# Patient Record
Sex: MALE | Race: WHITE | NOT HISPANIC OR LATINO | Employment: OTHER | URBAN - METROPOLITAN AREA
[De-identification: names, ages, dates, MRNs, and addresses within clinical notes are randomized per-mention and may not be internally consistent; named-entity substitution may affect disease eponyms.]

---

## 2017-01-24 ENCOUNTER — APPOINTMENT (OUTPATIENT)
Dept: CT IMAGING | Facility: HOSPITAL | Age: 67
End: 2017-01-24

## 2017-01-24 ENCOUNTER — HOSPITAL ENCOUNTER (INPATIENT)
Facility: HOSPITAL | Age: 67
LOS: 2 days | Discharge: HOME HEALTH | End: 2017-01-27
Attending: EMERGENCY MEDICINE | Admitting: FAMILY MEDICINE

## 2017-01-24 ENCOUNTER — APPOINTMENT (OUTPATIENT)
Dept: GENERAL RADIOLOGY | Facility: HOSPITAL | Age: 67
End: 2017-01-24

## 2017-01-24 DIAGNOSIS — Z74.09 IMPAIRED FUNCTIONAL MOBILITY, BALANCE, GAIT, AND ENDURANCE: ICD-10-CM

## 2017-01-24 DIAGNOSIS — J44.1 COPD EXACERBATION (HCC): Primary | ICD-10-CM

## 2017-01-24 DIAGNOSIS — R53.83 FATIGUE, UNSPECIFIED TYPE: ICD-10-CM

## 2017-01-24 DIAGNOSIS — R09.02 HYPOXIA: ICD-10-CM

## 2017-01-24 DIAGNOSIS — R06.89 HYPERCAPNIA: ICD-10-CM

## 2017-01-24 LAB
ALBUMIN SERPL-MCNC: 4.6 G/DL (ref 3.2–4.8)
ALBUMIN/GLOB SERPL: 1.3 G/DL (ref 1.5–2.5)
ALP SERPL-CCNC: 95 U/L (ref 25–100)
ALT SERPL W P-5'-P-CCNC: 13 U/L (ref 7–40)
ANION GAP SERPL CALCULATED.3IONS-SCNC: 3 MMOL/L (ref 3–11)
ARTERIAL PATENCY WRIST A: ABNORMAL
AST SERPL-CCNC: 18 U/L (ref 0–33)
ATMOSPHERIC PRESS: ABNORMAL MMHG
BASE EXCESS BLDA CALC-SCNC: 9.4 MMOL/L (ref 0–2)
BASOPHILS # BLD AUTO: 0.03 10*3/MM3 (ref 0–0.2)
BASOPHILS NFR BLD AUTO: 0.2 % (ref 0–1)
BDY SITE: ABNORMAL
BILIRUB SERPL-MCNC: 0.5 MG/DL (ref 0.3–1.2)
BUN BLD-MCNC: 14 MG/DL (ref 9–23)
BUN BLDA-MCNC: 16 MG/DL (ref 8–26)
BUN/CREAT SERPL: 17.5 (ref 7–25)
CA-I BLDA-SCNC: 1.16 MMOL/L (ref 1.2–1.32)
CALCIUM SPEC-SCNC: 10.2 MG/DL (ref 8.7–10.4)
CHLORIDE BLDA-SCNC: 99 MMOL/L (ref 98–109)
CHLORIDE SERPL-SCNC: 102 MMOL/L (ref 99–109)
CO2 BLDA-SCNC: 38.2 MMOL/L (ref 22–33)
CO2 BLDA-SCNC: 40 MMOL/L (ref 24–29)
CO2 SERPL-SCNC: 39 MMOL/L (ref 20–31)
COHGB MFR BLD: 2.6 % (ref 0–2)
CREAT BLD-MCNC: 0.8 MG/DL (ref 0.6–1.3)
CREAT BLDA-MCNC: 0.8 MG/DL (ref 0.6–1.3)
DEPRECATED RDW RBC AUTO: 52.3 FL (ref 37–54)
EOSINOPHIL # BLD AUTO: 0.29 10*3/MM3 (ref 0.1–0.3)
EOSINOPHIL NFR BLD AUTO: 1.9 % (ref 0–3)
ERYTHROCYTE [DISTWIDTH] IN BLOOD BY AUTOMATED COUNT: 15.6 % (ref 11.3–14.5)
GAS FLOW AIRWAY: 3 LPM
GFR SERPL CREATININE-BSD FRML MDRD: 117 ML/MIN/1.73
GFR SERPL CREATININE-BSD FRML MDRD: 96 ML/MIN/1.73
GLOBULIN UR ELPH-MCNC: 3.6 GM/DL
GLUCOSE BLD-MCNC: 92 MG/DL (ref 70–100)
GLUCOSE BLDC GLUCOMTR-MCNC: 96 MG/DL (ref 70–130)
HCO3 BLDA-SCNC: 36.2 MMOL/L (ref 20–26)
HCT VFR BLD AUTO: 44 % (ref 38.9–50.9)
HCT VFR BLD CALC: 38.3 %
HCT VFR BLDA CALC: 43 % (ref 38–51)
HGB BLD-MCNC: 13.6 G/DL (ref 13.1–17.5)
HGB BLDA-MCNC: 12.5 G/DL (ref 13.5–17.5)
HGB BLDA-MCNC: 14.6 G/DL (ref 12–17)
HOROWITZ INDEX BLD+IHG-RTO: 32 %
IMM GRANULOCYTES # BLD: 0.05 10*3/MM3 (ref 0–0.03)
IMM GRANULOCYTES NFR BLD: 0.3 % (ref 0–0.6)
INR PPP: 1.1 (ref 0.8–1.2)
LYMPHOCYTES # BLD AUTO: 2.09 10*3/MM3 (ref 0.6–4.8)
LYMPHOCYTES NFR BLD AUTO: 13.9 % (ref 24–44)
MCH RBC QN AUTO: 28.2 PG (ref 27–31)
MCHC RBC AUTO-ENTMCNC: 30.9 G/DL (ref 32–36)
MCV RBC AUTO: 91.3 FL (ref 80–99)
METHGB BLD QL: 0.4 % (ref 0–1.5)
MODALITY: ABNORMAL
MONOCYTES # BLD AUTO: 1.55 10*3/MM3 (ref 0–1)
MONOCYTES NFR BLD AUTO: 10.3 % (ref 0–12)
NEUTROPHILS # BLD AUTO: 11.05 10*3/MM3 (ref 1.5–8.3)
NEUTROPHILS NFR BLD AUTO: 73.4 % (ref 41–71)
OXYHGB MFR BLDV: 89.8 % (ref 94–99)
PCO2 BLDA: 65.1 MM HG (ref 35–48)
PH BLDA: 7.35 PH UNITS (ref 7.35–7.45)
PLATELET # BLD AUTO: 279 10*3/MM3 (ref 150–450)
PMV BLD AUTO: 10.1 FL (ref 6–12)
PO2 BLDA: 64.5 MM HG (ref 83–108)
POTASSIUM BLD-SCNC: 4.3 MMOL/L (ref 3.5–5.5)
POTASSIUM BLDA-SCNC: 4.3 MMOL/L (ref 3.5–4.9)
PROT SERPL-MCNC: 8.2 G/DL (ref 5.7–8.2)
PROTHROMBIN TIME: 12.8 SECONDS (ref 12.8–15.2)
RBC # BLD AUTO: 4.82 10*6/MM3 (ref 4.2–5.76)
SAO2 % BLDCOA: 89.8 %
SODIUM BLD-SCNC: 144 MMOL/L (ref 132–146)
SODIUM BLDA-SCNC: 142 MMOL/L (ref 138–146)
TROPONIN I SERPL-MCNC: 0 NG/ML (ref 0–0.07)
WBC NRBC COR # BLD: 15.06 10*3/MM3 (ref 3.5–10.8)

## 2017-01-24 PROCEDURE — 70450 CT HEAD/BRAIN W/O DYE: CPT

## 2017-01-24 PROCEDURE — 94799 UNLISTED PULMONARY SVC/PX: CPT

## 2017-01-24 PROCEDURE — 80047 BASIC METABLC PNL IONIZED CA: CPT

## 2017-01-24 PROCEDURE — 0 IOPAMIDOL PER 1 ML: Performed by: EMERGENCY MEDICINE

## 2017-01-24 PROCEDURE — 84484 ASSAY OF TROPONIN QUANT: CPT

## 2017-01-24 PROCEDURE — 82805 BLOOD GASES W/O2 SATURATION: CPT | Performed by: EMERGENCY MEDICINE

## 2017-01-24 PROCEDURE — 93005 ELECTROCARDIOGRAM TRACING: CPT | Performed by: EMERGENCY MEDICINE

## 2017-01-24 PROCEDURE — 71010 HC CHEST PA OR AP: CPT

## 2017-01-24 PROCEDURE — 94760 N-INVAS EAR/PLS OXIMETRY 1: CPT

## 2017-01-24 PROCEDURE — 85610 PROTHROMBIN TIME: CPT

## 2017-01-24 PROCEDURE — 80053 COMPREHEN METABOLIC PANEL: CPT | Performed by: EMERGENCY MEDICINE

## 2017-01-24 PROCEDURE — 94644 CONT INHLJ TX 1ST HOUR: CPT

## 2017-01-24 PROCEDURE — 0042T HC CT CEREBRAL PERFUSION W/WO CONTRAST: CPT

## 2017-01-24 PROCEDURE — 85014 HEMATOCRIT: CPT

## 2017-01-24 PROCEDURE — 63710000001 PREDNISONE PER 5 MG: Performed by: EMERGENCY MEDICINE

## 2017-01-24 PROCEDURE — 94640 AIRWAY INHALATION TREATMENT: CPT

## 2017-01-24 PROCEDURE — 99285 EMERGENCY DEPT VISIT HI MDM: CPT

## 2017-01-24 PROCEDURE — 36600 WITHDRAWAL OF ARTERIAL BLOOD: CPT | Performed by: EMERGENCY MEDICINE

## 2017-01-24 PROCEDURE — 85025 COMPLETE CBC W/AUTO DIFF WBC: CPT | Performed by: EMERGENCY MEDICINE

## 2017-01-24 RX ORDER — SODIUM CHLORIDE 0.9 % (FLUSH) 0.9 %
10 SYRINGE (ML) INJECTION AS NEEDED
Status: DISCONTINUED | OUTPATIENT
Start: 2017-01-24 | End: 2017-01-27 | Stop reason: HOSPADM

## 2017-01-24 RX ORDER — PREDNISONE 20 MG/1
60 TABLET ORAL ONCE
Status: COMPLETED | OUTPATIENT
Start: 2017-01-24 | End: 2017-01-24

## 2017-01-24 RX ORDER — ALBUTEROL SULFATE 2.5 MG/3ML
10 SOLUTION RESPIRATORY (INHALATION) CONTINUOUS
Status: DISPENSED | OUTPATIENT
Start: 2017-01-24 | End: 2017-01-25

## 2017-01-24 RX ADMIN — IOPAMIDOL 40 ML: 755 INJECTION, SOLUTION INTRAVENOUS at 22:32

## 2017-01-24 RX ADMIN — ALBUTEROL SULFATE 10 MG: 2.5 SOLUTION RESPIRATORY (INHALATION) at 23:50

## 2017-01-24 RX ADMIN — PREDNISONE 60 MG: 20 TABLET ORAL at 23:30

## 2017-01-24 NOTE — IP AVS SNAPSHOT
AFTER VISIT SUMMARY             Nam Alvarez           About your hospitalization     You were admitted on:  January 25, 2017 You last received care in the:  [facility]       Procedures & Surgeries         Medications    If you or your caregiver advised us that you are currently taking a medication and that medication is marked below as “Resume”, this simply indicates that we have reviewed those medications to make sure our new therapy recommendations do not interfere.  If you have concerns about medications other than those new ones which we are prescribing today, please consult the physician who prescribed them (or your primary physician).  Our review of your home medications is not meant to indicate that we are directing their use.             Your Medications      START taking these medications     doxycycline 100 MG capsule   Take 1 capsule by mouth Every 12 (Twelve) Hours for 7 days. Indications: Upper Respiratory Tract Infection   Last time this was given:  1/27/2017  8:26 AM   Commonly known as:  VIBRAMYCIN   Notes to Patient:  For infection, Take until complete           predniSONE 20 MG tablet   Take 1 tablet by mouth Daily With Breakfast. 40mg PO qday for 3d, 30 mg PO qday for 3d, 20 mg PO qday for  3d, 10 mg PO qday for  3d   Last time this was given:  1/27/2017  8:27 AM   Commonly known as:  DELTASONE             CONTINUE taking these medications     acetaminophen 500 MG tablet   Take 500 mg by mouth 3 (Three) Times a Day As Needed for mild pain (1-3).   Last time this was given:  1/25/2017  6:04 PM   Commonly known as:  TYLENOL           albuterol 108 (90 BASE) MCG/ACT inhaler   Inhale 2 puffs Every 4 (Four) Hours As Needed for wheezing.   Commonly known as:  PROVENTIL HFA;VENTOLIN HFA           baclofen 10 MG tablet   Take 10 mg by mouth 2 (Two) Times a Day.   Last time this was given:  1/27/2017  8:27 AM   Commonly known as:  LIORESAL           citalopram 20 MG tablet   Take  20 mg by mouth Daily.   Last time this was given:  1/27/2017  8:27 AM   Commonly known as:  CeleXA           fexofenadine 180 MG tablet   Take 180 mg by mouth Daily.   Commonly known as:  ALLEGRA           furosemide 40 MG tablet   Take 40 mg by mouth Daily. PRN swelling   Commonly known as:  LASIX           guaiFENesin 200 MG tablet   Take 600 mg by mouth Daily. At night           HYDROcodone-acetaminophen 7.5-325 MG per tablet   Take 1 tablet by mouth Every 8 (Eight) Hours As Needed for moderate pain (4-6).   Last time this was given:  1/27/2017  8:37 AM   Commonly known as:  NORCO           losartan 100 MG tablet   Take 100 mg by mouth Daily.   Commonly known as:  COZAAR           mirtazapine 15 MG tablet   Take 15 mg by mouth Every Night.   Last time this was given:  1/26/2017  8:24 PM   Commonly known as:  REMERON           mometasone-formoterol 100-5 MCG/ACT inhaler   Inhale 2 puffs 2 (Two) Times a Day.   Commonly known as:  DULERA 100           montelukast 10 MG tablet   Take 10 mg by mouth Every Night.   Last time this was given:  1/26/2017  8:24 PM   Commonly known as:  SINGULAIR           potassium chloride 10 MEQ CR capsule   Take 20 mEq by mouth As Needed (When taking Lasix).   Last time this was given:  1/27/2017  8:27 AM   Commonly known as:  MICRO-K           raNITIdine 150 MG tablet   Take 150 mg by mouth 2 (Two) Times a Day.   Commonly known as:  ZANTAC           simvastatin 20 MG tablet   Take 20 mg by mouth Every Night.   Commonly known as:  ZOCOR           tiotropium 18 MCG per inhalation capsule   Place 1 capsule into inhaler and inhale Daily.   Commonly known as:  SPIRIVA           traZODone 50 MG tablet   Take 50 mg by mouth Every Night.   Last time this was given:  1/26/2017  8:24 PM   Commonly known as:  DESYREL                Where to Get Your Medications      You can get these medications from any pharmacy     Bring a paper prescription for each of these medications     doxycycline 100 MG  capsule    predniSONE 20 MG tablet                  Your Medications      Your Medication List           Morning Noon Evening Bedtime As Needed    acetaminophen 500 MG tablet   Take 500 mg by mouth 3 (Three) Times a Day As Needed for mild pain (1-3).   Commonly known as:  TYLENOL                            3 times daily if needed       albuterol 108 (90 BASE) MCG/ACT inhaler   Inhale 2 puffs Every 4 (Four) Hours As Needed for wheezing.   Commonly known as:  PROVENTIL HFA;VENTOLIN HFA                            Every 4 hours if needed for breathing       baclofen 10 MG tablet   Take 10 mg by mouth 2 (Two) Times a Day.   Commonly known as:  LIORESAL                                      citalopram 20 MG tablet   Take 20 mg by mouth Daily.   Commonly known as:  CeleXA                                   doxycycline 100 MG capsule   Take 1 capsule by mouth Every 12 (Twelve) Hours for 7 days. Indications: Upper Respiratory Tract Infection   Commonly known as:  VIBRAMYCIN   Notes to Patient:  For infection, Take until complete                                      fexofenadine 180 MG tablet   Take 180 mg by mouth Daily.   Commonly known as:  ALLEGRA                                   furosemide 40 MG tablet   Take 40 mg by mouth Daily. PRN swelling   Commonly known as:  LASIX                            Daily if needed for swelling       guaiFENesin 200 MG tablet   Take 600 mg by mouth Daily. At night                                   HYDROcodone-acetaminophen 7.5-325 MG per tablet   Take 1 tablet by mouth Every 8 (Eight) Hours As Needed for moderate pain (4-6).   Commonly known as:  NORCO                            3 times daily if needed for pain       losartan 100 MG tablet   Take 100 mg by mouth Daily.   Commonly known as:  COZAAR                                   mirtazapine 15 MG tablet   Take 15 mg by mouth Every Night.   Commonly known as:  REMERON                                   mometasone-formoterol 100-5 MCG/ACT  inhaler   Inhale 2 puffs 2 (Two) Times a Day.   Commonly known as:  DULERA 100                                      montelukast 10 MG tablet   Take 10 mg by mouth Every Night.   Commonly known as:  SINGULAIR                                   potassium chloride 10 MEQ CR capsule   Take 20 mEq by mouth As Needed (When taking Lasix).   Commonly known as:  MICRO-K                            Take with furosemide if needed       predniSONE 20 MG tablet   Take 1 tablet by mouth Daily With Breakfast. 40mg PO qday for 3d, 30 mg PO qday for 3d, 20 mg PO qday for  3d, 10 mg PO qday for  3d   Commonly known as:  DELTASONE                                   raNITIdine 150 MG tablet   Take 150 mg by mouth 2 (Two) Times a Day.   Commonly known as:  ZANTAC                                      simvastatin 20 MG tablet   Take 20 mg by mouth Every Night.   Commonly known as:  ZOCOR                                   tiotropium 18 MCG per inhalation capsule   Place 1 capsule into inhaler and inhale Daily.   Commonly known as:  SPIRIVA                                   traZODone 50 MG tablet   Take 50 mg by mouth Every Night.   Commonly known as:  DESYREL                                            Instructions for After Discharge        Discharge References/Attachments     CHRONIC OBSTRUCTIVE PULMONARY DISEASE (ENGLISH)    SMOKING CESSATION (ENGLISH)    SMOKING CESSATION, TIPS FOR SUCCESS (ENGLISH)    STROKE PREVENTION (ENGLISH)    DOXYCYCLINE TABLETS OR CAPSULES (ENGLISH)    PREDNISONE TABLETS (ENGLISH)       Follow-ups for After Discharge        Follow-up Information     [facility] day[facility]    Specialty:  Home Health Services    Why:  Your home health has been resumed at discharge. Someone will contact you within 1 day of discharge to resume home health visits. Please call their office if you have not heard from them within a day of discharge.    Contact information:    [address]  [facility] [address]  [phone]        Follow up with No Known Provider .    Contact information:    [email or web address] [facility] [address]        Referrals and Follow-ups to Schedule     Follow-Up    As directed    In 1-2 weeks   Follow Up Details:  PCP             James Signup     [facility] [employer] allows you to send messages to your doctor, view your test results, renew your prescriptions, schedule appointments, and more. To sign up, go to [employer] and click on the Sign Up Now link in the New User? box. Enter your [employer] Activation Code exactly as it appears below along with the last four digits of your Social Security Number and your Date of Birth () to complete the sign-up process. If you do not sign up before the expiration date, you must request a new code.    [employer] Activation Code: [email or web address]  Expires: 2017  2:04 PM    If you have questions, you can email [email or web address] or call [phone] to talk to our [employer] staff. Remember, [employer] is NOT to be used for urgent needs. For medical emergencies, dial 911.           Summary of Your Hospitalization        Reason for Hospitalization     Your primary diagnosis was:  Not on File    Your diagnoses also included:  Chronic Bronchitis, Respiratory Insufficiency, Tobacco Abuse, Brain Disorder, Stroke      Care Providers     Provider Service Role Specialty    Naida Arzola MD Medicine Attending Provider Hospitalist      Your Allergies  Date Reviewed: 2017    No active allergies      Pending Labs     Order Current Status    Respiratory Culture In process    Blood Culture Preliminary result    Blood Culture Preliminary result      Patient Belongings Returned     Document Return of Belongings Flowsheet     Were the patient bedside belongings sent home?   --   Belongings Retrieved from Security & Sent Home   --    Belongings Sent to Safe   --   Medications Retrieved from Pharmacy & Sent Home    --              More Information      Chronic Obstructive Pulmonary Disease  Chronic obstructive pulmonary disease (COPD) is a common lung condition in which airflow from the lungs is limited. COPD is a general term that can be used to describe many different lung problems that limit airflow, including both chronic bronchitis and emphysema. If you have COPD, your lung function will probably never return to normal, but there are measures you can take to improve lung function and make yourself feel better.  CAUSES   · Smoking (common).  · Exposure to secondhand smoke.  · Genetic problems.  · Chronic inflammatory lung diseases or recurrent infections.  SYMPTOMS  · Shortness of breath, especially with physical activity.  · Deep, persistent (chronic) cough with a large amount of thick mucus.  · Wheezing.  · Rapid breaths (tachypnea).  · Gray or bluish discoloration (cyanosis) of the skin, especially in your fingers, toes, or lips.  · Fatigue.  · Weight loss.  · Frequent infections or episodes when breathing symptoms become much worse (exacerbations).  · Chest tightness.  DIAGNOSIS  Your health care provider will take a medical history and perform a physical examination to diagnose COPD. Additional tests for COPD may include:  · Lung (pulmonary) function tests.  · Chest X-ray.  · CT scan.  · Blood tests.  TREATMENT   Treatment for COPD may include:  · Inhaler and nebulizer medicines. These help manage the symptoms of COPD and make your breathing more comfortable.  · Supplemental oxygen. Supplemental oxygen is only helpful if you have a low oxygen level in your blood.  · Exercise and physical activity. These are beneficial for nearly all people with COPD.  · Lung surgery or transplant.  · Nutrition therapy to gain weight, if you are underweight.  · Pulmonary rehabilitation. This may involve working with a team of health care providers and specialists, such as respiratory, occupational, and physical therapists.  HOME CARE  INSTRUCTIONS  · Take all medicines (inhaled or pills) as directed by your health care provider.  · Avoid over-the-counter medicines or cough syrups that dry up your airway (such as antihistamines) and slow down the elimination of secretions unless instructed otherwise by your health care provider.  · If you are a smoker, the most important thing that you can do is stop smoking. Continuing to smoke will cause further lung damage and breathing trouble. Ask your health care provider for help with quitting smoking. He or she can direct you to community resources or hospitals that provide support.  · Avoid exposure to irritants such as smoke, chemicals, and fumes that aggravate your breathing.  · Use oxygen therapy and pulmonary rehabilitation if directed by your health care provider. If you require home oxygen therapy, ask your health care provider whether you should purchase a pulse oximeter to measure your oxygen level at home.  · Avoid contact with individuals who have a contagious illness.  · Avoid extreme temperature and humidity changes.  · Eat healthy foods. Eating smaller, more frequent meals and resting before meals may help you maintain your strength.  · Stay active, but balance activity with periods of rest. Exercise and physical activity will help you maintain your ability to do things you want to do.  · Preventing infection and hospitalization is very important when you have COPD. Make sure to receive all the vaccines your health care provider recommends, especially the pneumococcal and influenza vaccines. Ask your health care provider whether you need a pneumonia vaccine.  · Learn and use relaxation techniques to manage stress.  · Learn and use controlled breathing techniques as directed by your health care provider. Controlled breathing techniques include:    Pursed lip breathing. Start by breathing in (inhaling) through your nose for 1 second. Then, purse your lips as if you were going to whistle and  breathe out (exhale) through the pursed lips for 2 seconds.    Diaphragmatic breathing. Start by putting one hand on your abdomen just above your waist. Inhale slowly through your nose. The hand on your abdomen should move out. Then purse your lips and exhale slowly. You should be able to feel the hand on your abdomen moving in as you exhale.  · Learn and use controlled coughing to clear mucus from your lungs. Controlled coughing is a series of short, progressive coughs. The steps of controlled coughing are:  . Lean your head slightly forward.  . Breathe in deeply using diaphragmatic breathing.  . Try to hold your breath for 3 seconds.  . Keep your mouth slightly open while coughing twice.  . Spit any mucus out into a tissue.  . Rest and repeat the steps once or twice as needed.  SEEK MEDICAL CARE IF:  · You are coughing up more mucus than usual.  · There is a change in the color or thickness of your mucus.  · Your breathing is more labored than usual.  · Your breathing is faster than usual.  SEEK IMMEDIATE MEDICAL CARE IF:  · You have shortness of breath while you are resting.  · You have shortness of breath that prevents you from:    Being able to talk.    Performing your usual physical activities.  · You have chest pain lasting longer than 5 minutes.  · Your skin color is more cyanotic than usual.  · You measure low oxygen saturations for longer than 5 minutes with a pulse oximeter.  MAKE SURE YOU:  · Understand these instructions.  · Will watch your condition.  · Will get help right away if you are not doing well or get worse.     This information is not intended to replace advice given to you by your health care provider. Make sure you discuss any questions you have with your health care provider.     Document Released: 09/27/2006 Document Revised: 01/08/2016 Document Reviewed: 08/14/2014  [employer] Interactive Patient Education ©2016 [employer]          Steps to Quit Smoking   Smoking tobacco can be harmful  to your health and can affect almost every organ in your body. Smoking puts you, and those around you, at risk for developing many serious chronic diseases. Quitting smoking is difficult, but it is one of the best things that you can do for your health. It is never too late to quit.  WHAT ARE THE BENEFITS OF QUITTING SMOKING?  When you quit smoking, you lower your risk of developing serious diseases and conditions, such as:  · Lung cancer or lung disease, such as COPD.  · Heart disease.  · Stroke.  · Heart attack.  · Infertility.  · Osteoporosis and bone fractures.  Additionally, symptoms such as coughing, wheezing, and shortness of breath may get better when you quit. You may also find that you get sick less often because your body is stronger at fighting off colds and infections. If you are pregnant, quitting smoking can help to reduce your chances of having a baby of low birth weight.  HOW DO I GET READY TO QUIT?  When you decide to quit smoking, create a plan to make sure that you are successful. Before you quit:  · Pick a date to quit. Set a date within the next two weeks to give you time to prepare.  · Write down the reasons why you are quitting. Keep this list in places where you will see it often, such as on your bathroom mirror or in your car or wallet.  · Identify the people, places, things, and activities that make you want to smoke (triggers) and avoid them. Make sure to take these actions:    Throw away all cigarettes at home, at work, and in your car.    Throw away smoking accessories, such as ashtrays and lighters.    Clean your car and make sure to empty the ashtray.    Clean your home, including curtains and carpets.  · Tell your family, friends, and coworkers that you are quitting. Support from your loved ones can make quitting easier.  · Talk with your health care provider about your options for quitting smoking.  · Find out what treatment options are covered by your health insurance.  WHAT  "STRATEGIES CAN I USE TO QUIT SMOKING?   Talk with your healthcare provider about different strategies to quit smoking. Some strategies include:  · Quitting smoking altogether instead of gradually lessening how much you smoke over a period of time. Research shows that quitting \"cold turkey\" is more successful than gradually quitting.  · Attending in-person counseling to help you build problem-solving skills. You are more likely to have success in quitting if you attend several counseling sessions. Even short sessions of 10 minutes can be effective.  · Finding resources and support systems that can help you to quit smoking and remain smoke-free after you quit. These resources are most helpful when you use them often. They can include:    Online chats with a counselor.    Telephone quitlines.    Printed self-help materials.    Support groups or group counseling.    Text messaging programs.    Mobile phone applications.  · Taking medicines to help you quit smoking. (If you are pregnant or breastfeeding, talk with your health care provider first.) Some medicines contain nicotine and some do not. Both types of medicines help with cravings, but the medicines that include nicotine help to relieve withdrawal symptoms. Your health care provider may recommend:    Nicotine patches, gum, or lozenges.    Nicotine inhalers or sprays.    Non-nicotine medicine that is taken by mouth.  Talk with your health care provider about combining strategies, such as taking medicines while you are also receiving in-person counseling. Using these two strategies together makes you more likely to succeed in quitting than if you used either strategy on its own.  If you are pregnant or breastfeeding, talk with your health care provider about finding counseling or other support strategies to quit smoking. Do not take medicine to help you quit smoking unless told to do so by your health care provider.  WHAT THINGS CAN I DO TO MAKE IT EASIER TO " QUIT?  Quitting smoking might feel overwhelming at first, but there is a lot that you can do to make it easier. Take these important actions:  · Reach out to your family and friends and ask that they support and encourage you during this time. Call telephone quitlines, reach out to support groups, or work with a counselor for support.  · Ask people who smoke to avoid smoking around you.  · Avoid places that trigger you to smoke, such as bars, parties, or smoke-break areas at work.  · Spend time around people who do not smoke.  · Lessen stress in your life, because stress can be a smoking trigger for some people. To lessen stress, try:    Exercising regularly.    Deep-breathing exercises.    Yoga.    Meditating.    Performing a body scan. This involves closing your eyes, scanning your body from head to toe, and noticing which parts of your body are particularly tense. Purposefully relax the muscles in those areas.  · Download or purchase mobile phone or tablet apps (applications) that can help you stick to your quit plan by providing reminders, tips, and encouragement. There are many free apps, such as QuitGuide from the CDC (Centers for Disease Control and Prevention). You can find other support for quitting smoking (smoking cessation) through smokefree.gov and other websites.  HOW WILL I FEEL WHEN I QUIT SMOKING?  Within the first 24 hours of quitting smoking, you may start to feel some withdrawal symptoms. These symptoms are usually most noticeable 2-3 days after quitting, but they usually do not last beyond 2-3 weeks. Changes or symptoms that you might experience include:  · Mood swings.  · Restlessness, anxiety, or irritation.  · Difficulty concentrating.  · Dizziness.  · Strong cravings for sugary foods in addition to nicotine.  · Mild weight gain.  · Constipation.  · Nausea.  · Coughing or a sore throat.  · Changes in how your medicines work in your body.  · A depressed mood.  · Difficulty sleeping  (insomnia).  After the first 2-3 weeks of quitting, you may start to notice more positive results, such as:  · Improved sense of smell and taste.  · Decreased coughing and sore throat.  · Slower heart rate.  · Lower blood pressure.  · Clearer skin.  · The ability to breathe more easily.  · Fewer sick days.  Quitting smoking is very challenging for most people. Do not get discouraged if you are not successful the first time. Some people need to make many attempts to quit before they achieve long-term success. Do your best to stick to your quit plan, and talk with your health care provider if you have any questions or concerns.     This information is not intended to replace advice given to you by your health care provider. Make sure you discuss any questions you have with your health care provider.     Document Released: 12/12/2002 Document Revised: 05/03/2016 Document Reviewed: 05/03/2016  [employer] Patient Education ©2016 [employer]          Smoking Cessation, Tips for Success  If you are ready to quit smoking, congratulations! You have chosen to help yourself be healthier. Cigarettes bring nicotine, tar, carbon monoxide, and other irritants into your body. Your lungs, heart, and blood vessels will be able to work better without these poisons. There are many different ways to quit smoking. Nicotine gum, nicotine patches, a nicotine inhaler, or nicotine nasal spray can help with physical craving. Hypnosis, support groups, and medicines help break the habit of smoking.  WHAT THINGS CAN I DO TO MAKE QUITTING EASIER?   Here are some tips to help you quit for good:  · Pick a date when you will quit smoking completely. Tell all of your friends and family about your plan to quit on that date.  · Do not try to slowly cut down on the number of cigarettes you are smoking. Pick a quit date and quit smoking completely starting on that day.  · Throw away all cigarettes.    · Clean and remove all ashtrays from your  "home, work, and car.  · On a card, write down your reasons for quitting. Carry the card with you and read it when you get the urge to smoke.  · Cleanse your body of nicotine. Drink enough water and fluids to keep your urine clear or pale yellow. Do this after quitting to flush the nicotine from your body.  · Learn to predict your moods. Do not let a bad situation be your excuse to have a cigarette. Some situations in your life might tempt you into wanting a cigarette.  · Never have \"just one\" cigarette. It leads to wanting another and another. Remind yourself of your decision to quit.  · Change habits associated with smoking. If you smoked while driving or when feeling stressed, try other activities to replace smoking. Stand up when drinking your coffee. Brush your teeth after eating. Sit in a different chair when you read the paper. Avoid alcohol while trying to quit, and try to drink fewer caffeinated beverages. Alcohol and caffeine may urge you to smoke.  · Avoid foods and drinks that can trigger a desire to smoke, such as sugary or spicy foods and alcohol.  · Ask people who smoke not to smoke around you.  · Have something planned to do right after eating or having a cup of coffee. For example, plan to take a walk or exercise.  · Try a relaxation exercise to calm you down and decrease your stress. Remember, you may be tense and nervous for the first 2 weeks after you quit, but this will pass.  · Find new activities to keep your hands busy. Play with a pen, coin, or rubber band. Doodle or draw things on paper.  · Brush your teeth right after eating. This will help cut down on the craving for the taste of tobacco after meals. You can also try mouthwash.    · Use oral substitutes in place of cigarettes. Try using lemon drops, carrots, cinnamon sticks, or chewing gum. Keep them handy so they are available when you have the urge to smoke.  · When you have the urge to smoke, try deep breathing.  · Designate your home " "as a nonsmoking area.  · If you are a heavy smoker, ask your health care provider about a prescription for nicotine chewing gum. It can ease your withdrawal from nicotine.  · Reward yourself. Set aside the cigarette money you save and buy yourself something nice.  · Look for support from others. Join a support group or smoking cessation program. Ask someone at home or at work to help you with your plan to quit smoking.  · Always ask yourself, \"Do I need this cigarette or is this just a reflex?\" Tell yourself, \"Today, I choose not to smoke,\" or \"I do not want to smoke.\" You are reminding yourself of your decision to quit.  · Do not replace cigarette smoking with electronic cigarettes (commonly called e-cigarettes). The safety of e-cigarettes is unknown, and some may contain harmful chemicals.  · If you relapse, do not give up! Plan ahead and think about what you will do the next time you get the urge to smoke.  HOW WILL I FEEL WHEN I QUIT SMOKING?  You may have symptoms of withdrawal because your body is used to nicotine (the addictive substance in cigarettes). You may crave cigarettes, be irritable, feel very hungry, cough often, get headaches, or have difficulty concentrating. The withdrawal symptoms are only temporary. They are strongest when you first quit but will go away within 10-14 days. When withdrawal symptoms occur, stay in control. Think about your reasons for quitting. Remind yourself that these are signs that your body is healing and getting used to being without cigarettes. Remember that withdrawal symptoms are easier to treat than the major diseases that smoking can cause.   Even after the withdrawal is over, expect periodic urges to smoke. However, these cravings are generally short lived and will go away whether you smoke or not. Do not smoke!  WHAT RESOURCES ARE AVAILABLE TO HELP ME QUIT SMOKING?  Your health care provider can direct you to community resources or hospitals for support, which may " include:  · Group support.  · Education.  · Hypnosis.  · Therapy.     This information is not intended to replace advice given to you by your health care provider. Make sure you discuss any questions you have with your health care provider.     Document Released: 09/15/2005 Document Revised: 01/08/2016 Document Reviewed: 06/05/2014  [employer] Interactive Patient Education ©2016 [employer]          Stroke Prevention  Some medical conditions and behaviors are associated with an increased chance of having a stroke. You may prevent a stroke by making healthy choices and managing medical conditions.  HOW CAN I REDUCE MY RISK OF HAVING A STROKE?   · Stay physically active. Get at least 30 minutes of activity on most or all days.  · Do not smoke. It may also be helpful to avoid exposure to secondhand smoke.  · Limit alcohol use. Moderate alcohol use is considered to be:  ¨ No more than 2 drinks per day for men.  ¨ No more than 1 drink per day for nonpregnant women.  · Eat healthy foods. This involves:  ¨ Eating 5 or more servings of fruits and vegetables a day.  ¨ Making dietary changes that address high blood pressure (hypertension), high cholesterol, diabetes, or obesity.  · Manage your cholesterol levels.  ¨ Making food choices that are high in fiber and low in saturated fat, trans fat, and cholesterol may control cholesterol levels.  ¨ Take any prescribed medicines to control cholesterol as directed by your health care provider.  · Manage your diabetes.  ¨ Controlling your carbohydrate and sugar intake is recommended to manage diabetes.  ¨ Take any prescribed medicines to control diabetes as directed by your health care provider.  · Control your hypertension.  ¨ Making food choices that are low in salt (sodium), saturated fat, trans fat, and cholesterol is recommended to manage hypertension.  ¨ Ask your health care provider if you need treatment to lower your blood pressure. Take any prescribed medicines to control  hypertension as directed by your health care provider.  ¨ If you are 18-39 years of age, have your blood pressure checked every 3-5 years. If you are 40 years of age or older, have your blood pressure checked every year.  · Maintain a healthy weight.  ¨ Reducing calorie intake and making food choices that are low in sodium, saturated fat, trans fat, and cholesterol are recommended to manage weight.  · Stop drug abuse.  · Avoid taking birth control pills.  ¨ Talk to your health care provider about the risks of taking birth control pills if you are over 35 years old, smoke, get migraines, or have ever had a blood clot.  · Get evaluated for sleep disorders (sleep apnea).  ¨ Talk to your health care provider about getting a sleep evaluation if you snore a lot or have excessive sleepiness.  · Take medicines only as directed by your health care provider.  ¨ For some people, aspirin or blood thinners (anticoagulants) are helpful in reducing the risk of forming abnormal blood clots that can lead to stroke. If you have the irregular heart rhythm of atrial fibrillation, you should be on a blood thinner unless there is a good reason you cannot take them.  ¨ Understand all your medicine instructions.  · Make sure that other conditions (such as anemia or atherosclerosis) are addressed.  SEEK IMMEDIATE MEDICAL CARE IF:   · You have sudden weakness or numbness of the face, arm, or leg, especially on one side of the body.  · Your face or eyelid droops to one side.  · You have sudden confusion.  · You have trouble speaking (aphasia) or understanding.  · You have sudden trouble seeing in one or both eyes.  · You have sudden trouble walking.  · You have dizziness.  · You have a loss of balance or coordination.  · You have a sudden, severe headache with no known cause.  · You have new chest pain or an irregular heartbeat.  Any of these symptoms may represent a serious problem that is an emergency. Do not wait to see if the symptoms  will go away. Get medical help at once. Call your local emergency services (911 in [place] Do not drive yourself to the hospital.     This information is not intended to replace advice given to you by your health care provider. Make sure you discuss any questions you have with your health care provider.     Document Released: 01/25/2006 Document Revised: 01/08/2016 Document Reviewed: 06/20/2014  [employer] Interactive Patient Education ©2016 [employer]          Doxycycline tablets or capsules  What is this medicine?  DOXYCYCLINE (dox claudia nicole) is a tetracycline antibiotic. It kills certain bacteria or stops their growth. It is used to treat many kinds of infections, like dental, skin, respiratory, and urinary tract infections. It also treats acne, Lyme disease, malaria, and certain sexually transmitted infections.  This medicine may be used for other purposes; ask your health care provider or pharmacist if you have questions.  What should I tell my health care provider before I take this medicine?  They need to know if you have any of these conditions:  -liver disease  -long exposure to sunlight like working outdoors  -stomach problems like colitis  -an unusual or allergic reaction to doxycycline, tetracycline antibiotics, other medicines, foods, dyes, or preservatives  -pregnant or trying to get pregnant  -breast-feeding  How should I use this medicine?  Take this medicine by mouth with a full glass of water. Follow the directions on the prescription label. It is best to take this medicine without food, but if it upsets your stomach take it with food. Take your medicine at regular intervals. Do not take your medicine more often than directed. Take all of your medicine as directed even if you think you are better. Do not skip doses or stop your medicine early.  Talk to your pediatrician regarding the use of this medicine in children. While this drug may be prescribed for selected conditions, precautions do  apply.  Overdosage: If you think you have taken too much of this medicine contact a poison control center or emergency room at once.  NOTE: This medicine is only for you. Do not share this medicine with others.  What if I miss a dose?  If you miss a dose, take it as soon as you can. If it is almost time for your next dose, take only that dose. Do not take double or extra doses.  What may interact with this medicine?  -antacids  -barbiturates  -birth control pills  -bismuth subsalicylate  -carbamazepine  -methoxyflurane  -other antibiotics  -phenytoin  -vitamins that contain iron  -warfarin  This list may not describe all possible interactions. Give your health care provider a list of all the medicines, herbs, non-prescription drugs, or dietary supplements you use. Also tell them if you smoke, drink alcohol, or use illegal drugs. Some items may interact with your medicine.  What should I watch for while using this medicine?  Tell your doctor or health care professional if your symptoms do not improve.  Do not treat diarrhea with over the counter products. Contact your doctor if you have diarrhea that lasts more than 2 days or if it is severe and watery.  Do not take this medicine just before going to bed. It may not dissolve properly when you lay down and can cause pain in your throat. Drink plenty of fluids while taking this medicine to also help reduce irritation in your throat.  This medicine can make you more sensitive to the sun. Keep out of the sun. If you cannot avoid being in the sun, wear protective clothing and use sunscreen. Do not use sun lamps or tanning beds/booths.  Birth control pills may not work properly while you are taking this medicine. Talk to your doctor about using an extra method of birth control.  If you are being treated for a sexually transmitted infection, avoid sexual contact until you have finished your treatment. Your sexual partner may also need treatment.  Avoid antacids, aluminum,  calcium, magnesium, and iron products for 4 hours before and 2 hours after taking a dose of this medicine.  If you are using this medicine to prevent malaria, you should still protect yourself from contact with mosquitos. Stay in screened-in areas, use mosquito nets, keep your body covered, and use an insect repellent.  What side effects may I notice from receiving this medicine?  Side effects that you should report to your doctor or health care professional as soon as possible:  -allergic reactions like skin rash, itching or hives, swelling of the face, lips, or tongue  -difficulty breathing  -fever  -itching in the rectal or genital area  -pain on swallowing  -redness, blistering, peeling or loosening of the skin, including inside the mouth  -severe stomach pain or cramps  -unusual bleeding or bruising  -unusually weak or tired  -yellowing of the eyes or skin  Side effects that usually do not require medical attention (report to your doctor or health care professional if they continue or are bothersome):  -diarrhea  -loss of appetite  -nausea, vomiting  This list may not describe all possible side effects. Call your doctor for medical advice about side effects. You may report side effects to FDA at 3-735-FDA-3898.  Where should I keep my medicine?  Keep out of the reach of children.  Store at room temperature, below 30 degrees C (86 degrees F). Protect from light. Keep container tightly closed. Throw away any unused medicine after the expiration date. Taking this medicine after the expiration date can make you seriously ill.  NOTE: This sheet is a summary. It may not cover all possible information. If you have questions about this medicine, talk to your doctor, pharmacist, or health care provider.     © 2016, [employer]. (2016-04-08 12:10:28)          Prednisone tablets  What is this medicine?  PREDNISONE (PRED ni sone) is a corticosteroid. It is commonly used to treat inflammation of the skin, joints,  lungs, and other organs. Common conditions treated include asthma, allergies, and arthritis. It is also used for other conditions, such as blood disorders and diseases of the adrenal glands.  This medicine may be used for other purposes; ask your health care provider or pharmacist if you have questions.  What should I tell my health care provider before I take this medicine?  They need to know if you have any of these conditions:  -Cushing's syndrome  -diabetes  -glaucoma  -heart disease  -high blood pressure  -infection (especially a virus infection such as chickenpox, cold sores, or herpes)  -kidney disease  -liver disease  -mental illness  -myasthenia gravis  -osteoporosis  -seizures  -stomach or intestine problems  -thyroid disease  -an unusual or allergic reaction to lactose, prednisone, other medicines, foods, dyes, or preservatives  -pregnant or trying to get pregnant  -breast-feeding  How should I use this medicine?  Take this medicine by mouth with a glass of water. Follow the directions on the prescription label. Take this medicine with food. If you are taking this medicine once a day, take it in the morning. Do not take more medicine than you are told to take. Do not suddenly stop taking your medicine because you may develop a severe reaction. Your doctor will tell you how much medicine to take. If your doctor wants you to stop the medicine, the dose may be slowly lowered over time to avoid any side effects.  Talk to your pediatrician regarding the use of this medicine in children. Special care may be needed.  Overdosage: If you think you have taken too much of this medicine contact a poison control center or emergency room at once.  NOTE: This medicine is only for you. Do not share this medicine with others.  What if I miss a dose?  If you miss a dose, take it as soon as you can. If it is almost time for your next dose, talk to your doctor or health care professional. You may need to miss a dose or take  an extra dose. Do not take double or extra doses without advice.  What may interact with this medicine?  Do not take this medicine with any of the following medications:  -metyrapone  -mifepristone  This medicine may also interact with the following medications:  -aminoglutethimide  -amphotericin B  -aspirin and aspirin-like medicines  -barbiturates  -certain medicines for diabetes, like glipizide or glyburide  -cholestyramine  -cholinesterase inhibitors  -cyclosporine  -digoxin  -diuretics  -ephedrine  -female hormones, like estrogens and birth control pills  -isoniazid  -ketoconazole  -NSAIDS, medicines for pain and inflammation, like ibuprofen or naproxen  -phenytoin  -rifampin  -toxoids  -vaccines  -warfarin  This list may not describe all possible interactions. Give your health care provider a list of all the medicines, herbs, non-prescription drugs, or dietary supplements you use. Also tell them if you smoke, drink alcohol, or use illegal drugs. Some items may interact with your medicine.  What should I watch for while using this medicine?  Visit your doctor or health care professional for regular checks on your progress. If you are taking this medicine over a prolonged period, carry an identification card with your name and address, the type and dose of your medicine, and your doctor's name and address.  This medicine may increase your risk of getting an infection. Tell your doctor or health care professional if you are around anyone with measles or chickenpox, or if you develop sores or blisters that do not heal properly.  If you are going to have surgery, tell your doctor or health care professional that you have taken this medicine within the last twelve months.  Ask your doctor or health care professional about your diet. You may need to lower the amount of salt you eat.  This medicine may affect blood sugar levels. If you have diabetes, check with your doctor or health care professional before you change  your diet or the dose of your diabetic medicine.  What side effects may I notice from receiving this medicine?  Side effects that you should report to your doctor or health care professional as soon as possible:  -allergic reactions like skin rash, itching or hives, swelling of the face, lips, or tongue  -changes in emotions or moods  -changes in vision  -depressed mood  -eye pain  -fever or chills, cough, sore throat, pain or difficulty passing urine  -increased thirst  -swelling of ankles, feet  Side effects that usually do not require medical attention (report to your doctor or health care professional if they continue or are bothersome):  -confusion, excitement, restlessness  -headache  -nausea, vomiting  -skin problems, acne, thin and shiny skin  -trouble sleeping  -weight gain  This list may not describe all possible side effects. Call your doctor for medical advice about side effects. You may report side effects to FDA at 5-725-FDA-6863.  Where should I keep my medicine?  Keep out of the reach of children.  Store at room temperature between 15 and 30 degrees C (59 and 86 degrees F). Protect from light. Keep container tightly closed. Throw away any unused medicine after the expiration date.  NOTE: This sheet is a summary. It may not cover all possible information. If you have questions about this medicine, talk to your doctor, pharmacist, or health care provider.     © 2016, [employer]. (2012-08-02 10:57:14)            SYMPTOMS OF A STROKE    Call 911 or have someone take you to the Emergency Department if you have any of the following:    · Sudden numbness or weakness of your face, arm or leg especially on one side of the body  · Sudden confusion, diffiiculty speaking or trouble understanding   · Changes in your vision or loss of sight in one eye  · Sudden severe headache with no known cause  · sudden dizziness, trouble walking, loss of balance or coordination    It is important to seek emergency  care right away if you have further stroke symptoms. If you get emergency help quickly, the powerful clot-dissolving medicines can reduce the disabilities caused by a stroke.     For more information:    American Stroke Association  5-908-7-STROKE  www.strokeassociation.org           IF YOU SMOKE OR USE TOBACCO PLEASE READ THE FOLLOWING:    Why is smoking bad for me?  Smoking increases the risk of heart disease, lung disease, vascular disease, stroke, and cancer.     If you smoke, STOP!    If you would like more information on quitting smoking, please visit the [employer] website: [email or web address]   This link will provide additional resources including the QUIT line and the [employer] support groups.     For more information:    American Cancer Society  [phone]    American Heart Association  1-978.721.7892               YOU ARE THE MOST IMPORTANT FACTOR IN YOUR RECOVERY.     Follow all instructions carefully.     I have reviewed my discharge instructions with my nurse, including the following information, if applicable:     Information about my illness and diagnosis   Follow up appointments (including lab draws)   Wound Care   Equipment Needs   Medications (new and continuing) along with side effects   Preventative information such as vaccines and smoking cessations   Diet   Pain   I know when to contact my Doctor's office or seek emergency care      I want my nurse to describe the side effects of my medications: YES NO   If the answer is no, I understand the side effects of my medications: YES NO   My nurse described the side effects of my medications in a way that I could understand: YES NO   I have taken my personal belongings and my own medications with me at discharge: YES NO            I have received this information and my questions have been answered. I have discussed any concerns I see with this plan with the nurse or physician. I understand  these instructions.    Signature of Patient or Responsible Person: _____________________________________    Date: _________________  Time: __________________    Signature of Healthcare Provider: _______________________________________  Date: _________________  Time: __________________

## 2017-01-25 PROBLEM — Z72.0 TOBACCO ABUSE: Status: ACTIVE | Noted: 2017-01-25

## 2017-01-25 PROBLEM — J96.02 ACUTE HYPERCAPNIC RESPIRATORY FAILURE (HCC): Status: ACTIVE | Noted: 2017-01-25

## 2017-01-25 PROBLEM — J44.1 COPD EXACERBATION (HCC): Status: ACTIVE | Noted: 2017-01-25

## 2017-01-25 PROBLEM — G93.40 ENCEPHALOPATHY: Status: ACTIVE | Noted: 2017-01-25

## 2017-01-25 LAB
ARTERIAL PATENCY WRIST A: ABNORMAL
ATMOSPHERIC PRESS: ABNORMAL MMHG
BASE EXCESS BLDA CALC-SCNC: 9.8 MMOL/L (ref 0–2)
BASOPHILS # BLD AUTO: 0.03 10*3/MM3 (ref 0–0.2)
BASOPHILS NFR BLD AUTO: 0.2 % (ref 0–1)
BDY SITE: ABNORMAL
CA-I SERPL ISE-MCNC: 1.18 MMOL/L (ref 1.12–1.32)
CO2 BLDA-SCNC: 38.1 MMOL/L (ref 22–33)
COHGB MFR BLD: 2.4 % (ref 0–2)
DEPRECATED RDW RBC AUTO: 52.8 FL (ref 37–54)
EOSINOPHIL # BLD AUTO: 0.03 10*3/MM3 (ref 0.1–0.3)
EOSINOPHIL NFR BLD AUTO: 0.2 % (ref 0–3)
ERYTHROCYTE [DISTWIDTH] IN BLOOD BY AUTOMATED COUNT: 15.6 % (ref 11.3–14.5)
HCO3 BLDA-SCNC: 36.2 MMOL/L (ref 20–26)
HCT VFR BLD AUTO: 38.6 % (ref 38.9–50.9)
HCT VFR BLD CALC: 36.3 %
HGB BLD-MCNC: 11.7 G/DL (ref 13.1–17.5)
HGB BLDA-MCNC: 11.9 G/DL (ref 13.5–17.5)
HOLD SPECIMEN: NORMAL
HOROWITZ INDEX BLD+IHG-RTO: 35 %
IMM GRANULOCYTES # BLD: 0.05 10*3/MM3 (ref 0–0.03)
IMM GRANULOCYTES NFR BLD: 0.3 % (ref 0–0.6)
LYMPHOCYTES # BLD AUTO: 0.92 10*3/MM3 (ref 0.6–4.8)
LYMPHOCYTES NFR BLD AUTO: 6 % (ref 24–44)
MCH RBC QN AUTO: 27.7 PG (ref 27–31)
MCHC RBC AUTO-ENTMCNC: 30.3 G/DL (ref 32–36)
MCV RBC AUTO: 91.3 FL (ref 80–99)
METHGB BLD QL: 0.9 % (ref 0–1.5)
MODALITY: ABNORMAL
MONOCYTES # BLD AUTO: 1.39 10*3/MM3 (ref 0–1)
MONOCYTES NFR BLD AUTO: 9.1 % (ref 0–12)
NEUTROPHILS # BLD AUTO: 12.91 10*3/MM3 (ref 1.5–8.3)
NEUTROPHILS NFR BLD AUTO: 84.2 % (ref 41–71)
OXYHGB MFR BLDV: 90.8 % (ref 94–99)
PCO2 BLDA: 61.6 MM HG (ref 35–48)
PH BLDA: 7.38 PH UNITS (ref 7.35–7.45)
PLATELET # BLD AUTO: 247 10*3/MM3 (ref 150–450)
PMV BLD AUTO: 10.5 FL (ref 6–12)
PO2 BLDA: 65.1 MM HG (ref 83–108)
RBC # BLD AUTO: 4.23 10*6/MM3 (ref 4.2–5.76)
WBC NRBC COR # BLD: 15.33 10*3/MM3 (ref 3.5–10.8)
WHOLE BLOOD HOLD SPECIMEN: NORMAL

## 2017-01-25 PROCEDURE — 85025 COMPLETE CBC W/AUTO DIFF WBC: CPT | Performed by: NURSE PRACTITIONER

## 2017-01-25 PROCEDURE — 25010000002 LEVOFLOXACIN PER 250 MG: Performed by: NURSE PRACTITIONER

## 2017-01-25 PROCEDURE — 94799 UNLISTED PULMONARY SVC/PX: CPT

## 2017-01-25 PROCEDURE — 25010000002 METHYLPREDNISOLONE PER 125 MG: Performed by: NURSE PRACTITIONER

## 2017-01-25 PROCEDURE — 82805 BLOOD GASES W/O2 SATURATION: CPT | Performed by: NURSE PRACTITIONER

## 2017-01-25 PROCEDURE — 94660 CPAP INITIATION&MGMT: CPT

## 2017-01-25 PROCEDURE — 94640 AIRWAY INHALATION TREATMENT: CPT

## 2017-01-25 PROCEDURE — 94760 N-INVAS EAR/PLS OXIMETRY 1: CPT

## 2017-01-25 PROCEDURE — 82330 ASSAY OF CALCIUM: CPT | Performed by: NURSE PRACTITIONER

## 2017-01-25 PROCEDURE — 25010000002 ENOXAPARIN PER 10 MG: Performed by: NURSE PRACTITIONER

## 2017-01-25 PROCEDURE — 87040 BLOOD CULTURE FOR BACTERIA: CPT | Performed by: NURSE PRACTITIONER

## 2017-01-25 PROCEDURE — 99223 1ST HOSP IP/OBS HIGH 75: CPT | Performed by: INTERNAL MEDICINE

## 2017-01-25 PROCEDURE — 5A09357 ASSISTANCE WITH RESPIRATORY VENTILATION, LESS THAN 24 CONSECUTIVE HOURS, CONTINUOUS POSITIVE AIRWAY PRESSURE: ICD-10-PCS | Performed by: EMERGENCY MEDICINE

## 2017-01-25 PROCEDURE — 63710000001 PREDNISONE PER 5 MG: Performed by: HOSPITALIST

## 2017-01-25 PROCEDURE — 36600 WITHDRAWAL OF ARTERIAL BLOOD: CPT | Performed by: NURSE PRACTITIONER

## 2017-01-25 RX ORDER — LOSARTAN POTASSIUM 100 MG/1
100 TABLET ORAL DAILY
COMMUNITY

## 2017-01-25 RX ORDER — POTASSIUM CHLORIDE 750 MG/1
20 CAPSULE, EXTENDED RELEASE ORAL DAILY
Status: DISCONTINUED | OUTPATIENT
Start: 2017-01-25 | End: 2017-01-27 | Stop reason: HOSPADM

## 2017-01-25 RX ORDER — ACETAMINOPHEN 325 MG/1
650 TABLET ORAL EVERY 4 HOURS PRN
Status: DISCONTINUED | OUTPATIENT
Start: 2017-01-25 | End: 2017-01-27 | Stop reason: HOSPADM

## 2017-01-25 RX ORDER — LOSARTAN POTASSIUM 50 MG/1
100 TABLET ORAL DAILY
Status: DISCONTINUED | OUTPATIENT
Start: 2017-01-25 | End: 2017-01-25

## 2017-01-25 RX ORDER — METHYLPREDNISOLONE SODIUM SUCCINATE 125 MG/2ML
60 INJECTION, POWDER, LYOPHILIZED, FOR SOLUTION INTRAMUSCULAR; INTRAVENOUS EVERY 8 HOURS SCHEDULED
Status: DISCONTINUED | OUTPATIENT
Start: 2017-01-25 | End: 2017-01-25

## 2017-01-25 RX ORDER — LOSARTAN POTASSIUM 50 MG/1
100 TABLET ORAL DAILY
Status: ON HOLD | COMMUNITY
End: 2017-01-25

## 2017-01-25 RX ORDER — LEVOFLOXACIN 5 MG/ML
750 INJECTION, SOLUTION INTRAVENOUS DAILY
Status: DISCONTINUED | OUTPATIENT
Start: 2017-01-25 | End: 2017-01-25

## 2017-01-25 RX ORDER — RANITIDINE 150 MG/1
150 TABLET ORAL 2 TIMES DAILY
COMMUNITY

## 2017-01-25 RX ORDER — MONTELUKAST SODIUM 10 MG/1
10 TABLET ORAL NIGHTLY
Status: DISCONTINUED | OUTPATIENT
Start: 2017-01-25 | End: 2017-01-27 | Stop reason: HOSPADM

## 2017-01-25 RX ORDER — SIMVASTATIN 20 MG
20 TABLET ORAL NIGHTLY
COMMUNITY

## 2017-01-25 RX ORDER — ONDANSETRON 4 MG/1
4 TABLET, FILM COATED ORAL EVERY 6 HOURS PRN
Status: DISCONTINUED | OUTPATIENT
Start: 2017-01-25 | End: 2017-01-27 | Stop reason: HOSPADM

## 2017-01-25 RX ORDER — HYDROCODONE BITARTRATE AND ACETAMINOPHEN 7.5; 325 MG/1; MG/1
1 TABLET ORAL EVERY 8 HOURS PRN
Status: DISCONTINUED | OUTPATIENT
Start: 2017-01-25 | End: 2017-01-27 | Stop reason: HOSPADM

## 2017-01-25 RX ORDER — BISACODYL 10 MG
10 SUPPOSITORY, RECTAL RECTAL DAILY PRN
Status: DISCONTINUED | OUTPATIENT
Start: 2017-01-25 | End: 2017-01-27 | Stop reason: HOSPADM

## 2017-01-25 RX ORDER — TRAZODONE HYDROCHLORIDE 50 MG/1
50 TABLET ORAL NIGHTLY
Status: DISCONTINUED | OUTPATIENT
Start: 2017-01-25 | End: 2017-01-27 | Stop reason: HOSPADM

## 2017-01-25 RX ORDER — TRAZODONE HYDROCHLORIDE 50 MG/1
50 TABLET ORAL NIGHTLY
COMMUNITY

## 2017-01-25 RX ORDER — ONDANSETRON 2 MG/ML
4 INJECTION INTRAMUSCULAR; INTRAVENOUS EVERY 6 HOURS PRN
Status: DISCONTINUED | OUTPATIENT
Start: 2017-01-25 | End: 2017-01-27 | Stop reason: HOSPADM

## 2017-01-25 RX ORDER — FUROSEMIDE 40 MG/1
40 TABLET ORAL DAILY
COMMUNITY

## 2017-01-25 RX ORDER — SENNA AND DOCUSATE SODIUM 50; 8.6 MG/1; MG/1
2 TABLET, FILM COATED ORAL 2 TIMES DAILY
Status: DISCONTINUED | OUTPATIENT
Start: 2017-01-25 | End: 2017-01-27 | Stop reason: HOSPADM

## 2017-01-25 RX ORDER — GUAIFENESIN 200 MG/1
600 TABLET ORAL DAILY
COMMUNITY

## 2017-01-25 RX ORDER — CITALOPRAM 20 MG/1
20 TABLET ORAL DAILY
COMMUNITY

## 2017-01-25 RX ORDER — BACLOFEN 10 MG/1
10 TABLET ORAL 2 TIMES DAILY
Status: DISCONTINUED | OUTPATIENT
Start: 2017-01-25 | End: 2017-01-27 | Stop reason: HOSPADM

## 2017-01-25 RX ORDER — PREDNISONE 20 MG/1
40 TABLET ORAL
Status: DISCONTINUED | OUTPATIENT
Start: 2017-01-25 | End: 2017-01-27 | Stop reason: HOSPADM

## 2017-01-25 RX ORDER — BACLOFEN 10 MG/1
10 TABLET ORAL 2 TIMES DAILY
COMMUNITY

## 2017-01-25 RX ORDER — CITALOPRAM 20 MG/1
20 TABLET ORAL DAILY
Status: DISCONTINUED | OUTPATIENT
Start: 2017-01-25 | End: 2017-01-27 | Stop reason: HOSPADM

## 2017-01-25 RX ORDER — MIRTAZAPINE 15 MG/1
15 TABLET, FILM COATED ORAL NIGHTLY
COMMUNITY

## 2017-01-25 RX ORDER — MONTELUKAST SODIUM 10 MG/1
10 TABLET ORAL NIGHTLY
COMMUNITY

## 2017-01-25 RX ORDER — HYDROCODONE BITARTRATE AND ACETAMINOPHEN 7.5; 325 MG/1; MG/1
1 TABLET ORAL EVERY 8 HOURS PRN
COMMUNITY

## 2017-01-25 RX ORDER — NICOTINE 21 MG/24HR
1 PATCH, TRANSDERMAL 24 HOURS TRANSDERMAL EVERY 24 HOURS
Status: DISCONTINUED | OUTPATIENT
Start: 2017-01-25 | End: 2017-01-27 | Stop reason: HOSPADM

## 2017-01-25 RX ORDER — IPRATROPIUM BROMIDE AND ALBUTEROL SULFATE 2.5; .5 MG/3ML; MG/3ML
3 SOLUTION RESPIRATORY (INHALATION)
Status: DISCONTINUED | OUTPATIENT
Start: 2017-01-25 | End: 2017-01-27 | Stop reason: HOSPADM

## 2017-01-25 RX ORDER — IPRATROPIUM BROMIDE AND ALBUTEROL SULFATE 2.5; .5 MG/3ML; MG/3ML
3 SOLUTION RESPIRATORY (INHALATION) EVERY 4 HOURS PRN
Status: DISCONTINUED | OUTPATIENT
Start: 2017-01-25 | End: 2017-01-27 | Stop reason: HOSPADM

## 2017-01-25 RX ORDER — POTASSIUM CHLORIDE 750 MG/1
20 CAPSULE, EXTENDED RELEASE ORAL AS NEEDED
COMMUNITY

## 2017-01-25 RX ORDER — ACETAMINOPHEN 500 MG
500 TABLET ORAL 3 TIMES DAILY PRN
COMMUNITY

## 2017-01-25 RX ORDER — MIRTAZAPINE 15 MG/1
15 TABLET, FILM COATED ORAL NIGHTLY
Status: DISCONTINUED | OUTPATIENT
Start: 2017-01-25 | End: 2017-01-27 | Stop reason: HOSPADM

## 2017-01-25 RX ORDER — SODIUM CHLORIDE 0.9 % (FLUSH) 0.9 %
1-10 SYRINGE (ML) INJECTION AS NEEDED
Status: DISCONTINUED | OUTPATIENT
Start: 2017-01-25 | End: 2017-01-27 | Stop reason: HOSPADM

## 2017-01-25 RX ORDER — FEXOFENADINE HCL 180 MG/1
180 TABLET ORAL DAILY
COMMUNITY

## 2017-01-25 RX ORDER — DOXYCYCLINE HYCLATE 100 MG/1
100 CAPSULE ORAL EVERY 12 HOURS SCHEDULED
Status: DISCONTINUED | OUTPATIENT
Start: 2017-01-25 | End: 2017-01-27 | Stop reason: HOSPADM

## 2017-01-25 RX ORDER — ALBUTEROL SULFATE 90 UG/1
2 AEROSOL, METERED RESPIRATORY (INHALATION) EVERY 4 HOURS PRN
COMMUNITY

## 2017-01-25 RX ADMIN — POTASSIUM CHLORIDE 20 MEQ: 750 CAPSULE, EXTENDED RELEASE ORAL at 12:25

## 2017-01-25 RX ADMIN — DOCUSATE SODIUM AND SENNOSIDES 2 TABLET: 8.6; 5 TABLET, FILM COATED ORAL at 18:04

## 2017-01-25 RX ADMIN — BACLOFEN 10 MG: 10 TABLET ORAL at 12:26

## 2017-01-25 RX ADMIN — METHYLPREDNISOLONE SODIUM SUCCINATE 60 MG: 125 INJECTION, POWDER, FOR SOLUTION INTRAMUSCULAR; INTRAVENOUS at 09:39

## 2017-01-25 RX ADMIN — IPRATROPIUM BROMIDE AND ALBUTEROL SULFATE 3 ML: .5; 3 SOLUTION RESPIRATORY (INHALATION) at 23:49

## 2017-01-25 RX ADMIN — IPRATROPIUM BROMIDE AND ALBUTEROL SULFATE 3 ML: .5; 3 SOLUTION RESPIRATORY (INHALATION) at 16:05

## 2017-01-25 RX ADMIN — IPRATROPIUM BROMIDE AND ALBUTEROL SULFATE 3 ML: .5; 3 SOLUTION RESPIRATORY (INHALATION) at 18:43

## 2017-01-25 RX ADMIN — DOXYCYCLINE HYCLATE 100 MG: 100 CAPSULE, GELATIN COATED ORAL at 20:33

## 2017-01-25 RX ADMIN — MIRTAZAPINE 15 MG: 15 TABLET, FILM COATED ORAL at 20:33

## 2017-01-25 RX ADMIN — PREDNISONE 40 MG: 20 TABLET ORAL at 12:26

## 2017-01-25 RX ADMIN — METHYLPREDNISOLONE SODIUM SUCCINATE 60 MG: 125 INJECTION, POWDER, FOR SOLUTION INTRAMUSCULAR; INTRAVENOUS at 02:47

## 2017-01-25 RX ADMIN — NICOTINE 1 PATCH: 21 PATCH, EXTENDED RELEASE TRANSDERMAL at 05:17

## 2017-01-25 RX ADMIN — DOCUSATE SODIUM AND SENNOSIDES 2 TABLET: 8.6; 5 TABLET, FILM COATED ORAL at 12:26

## 2017-01-25 RX ADMIN — HYDROCODONE BITARTRATE AND ACETAMINOPHEN 1 TABLET: 7.5; 325 TABLET ORAL at 13:57

## 2017-01-25 RX ADMIN — IPRATROPIUM BROMIDE AND ALBUTEROL SULFATE 3 ML: .5; 3 SOLUTION RESPIRATORY (INHALATION) at 07:26

## 2017-01-25 RX ADMIN — CITALOPRAM HYDROBROMIDE 20 MG: 20 TABLET ORAL at 12:26

## 2017-01-25 RX ADMIN — LEVOFLOXACIN 750 MG: 5 INJECTION INTRAVENOUS at 02:48

## 2017-01-25 RX ADMIN — TRAZODONE HYDROCHLORIDE 50 MG: 50 TABLET ORAL at 20:33

## 2017-01-25 RX ADMIN — IPRATROPIUM BROMIDE AND ALBUTEROL SULFATE 3 ML: .5; 3 SOLUTION RESPIRATORY (INHALATION) at 03:23

## 2017-01-25 RX ADMIN — ENOXAPARIN SODIUM 40 MG: 40 INJECTION SUBCUTANEOUS at 09:40

## 2017-01-25 RX ADMIN — DOXYCYCLINE HYCLATE 100 MG: 100 CAPSULE, GELATIN COATED ORAL at 12:26

## 2017-01-25 RX ADMIN — ACETAMINOPHEN 650 MG: 325 TABLET, FILM COATED ORAL at 18:04

## 2017-01-25 RX ADMIN — BACLOFEN 10 MG: 10 TABLET ORAL at 18:04

## 2017-01-25 RX ADMIN — MONTELUKAST SODIUM 10 MG: 10 TABLET, FILM COATED ORAL at 20:33

## 2017-01-25 RX ADMIN — IPRATROPIUM BROMIDE AND ALBUTEROL SULFATE 3 ML: .5; 3 SOLUTION RESPIRATORY (INHALATION) at 10:31

## 2017-01-25 NOTE — PROGRESS NOTES
Discharge Planning Assessment  [facility]     Patient Name: Nam Alvarez  MRN: [id number]  Today's Date: 1/25/2017    Admit Date: 1/24/2017          Discharge Needs Assessment       01/25/17 1517    Living Environment    Lives With child(tosha), adult   daughter Prema    Living Arrangements apartment    Provides Primary Care For no one, unable/limited ability to care for self    Primary Care Provided By child(tosha) (specify)    Caregiving Concerns daughter Prema    Quality Of Family Relationships supportive;helpful    Able to Return to Prior Living Arrangements yes    Discharge Needs Assessment    Concerns To Be Addressed denies needs/concerns at this time    Readmission Within The Last 30 Days no previous admission in last 30 days    Outpatient/Agency/Support Group Needs homecare agency (specify level of care)    Community Agency Name(S) patient is current with [facility]  for SN dx: COPD, HTN, HF, will need resume HH orders at dc    Anticipated Changes Related to Illness none    Equipment Currently Used at Home wheelchair;walker, rolling;cane, straight;commode;shower chair    Equipment Needed After Discharge none    Discharge Facility/Level Of Care Needs home with home health    Discharge Disposition home healthcare service;home or self-care    Discharge Contact Information if Applicable alex Lloyd [phone]            Discharge Plan       01/25/17 1524    Case Management/Social Work Plan    Plan home with home health    Patient/Family In Agreement With Plan yes    Additional Comments Met with patient and daughter at bedside to initiate discharge planning. Patient currently lives with daughter in a single floor home in [facility]. He states he has all DME he needs and is current with [facility] for SN. Will need resume HH orders at dc. He is independent with a rolling walker/wheelchair. His goal is to return home with the assistance of his daughter and HH services. His daughter can provide transport  home via car. He denies needs at this time. CM will follow.         Discharge Placement     No information found        Expected Discharge Date and Time     Expected Discharge Date Expected Discharge Time    Jan 27, 2017               Demographic Summary       01/25/17 1515    Referral Information    Referral Source admission list    Reason For Consult discharge planning    Record Reviewed history and physical;medical record    Contact Information    Permission Granted to Share Information With ;family/designee    Comments daughter Prema Alvarez [phone]            Functional Status       01/25/17 1516    Functional Status Prior    Ambulation 1-->assistive equipment    Transferring 1-->assistive equipment    Toileting 1-->assistive equipment    Bathing 3-->assistive equipment and person    Dressing 3-->assistive equipment and person    Eating 0-->independent    Communication 0-->understands/communicates without difficulty    IADL    Medications assistive person    Meal Preparation assistive person    Housekeeping assistive person    Laundry assistive person    Shopping assistive person    Oral Care assistive person    Employment/Financial    Employment/Finance Comments Verified patient's insurance as Medicare A/B. Patient has Rx coverage and can afford copays.             Psychosocial     None            Abuse/Neglect     None            Legal     None            Substance Abuse     None            Patient Forms     None          Bhumi Alfonso, GRACIE

## 2017-01-25 NOTE — ED PROVIDER NOTES
Subjective   HPI Comments: Nam Alvarez is a 66 y.o.male who presents to the ED with c/o generalized weakness, fatigue, and slurred speech. Patient has hx CVA, as well as hx COPD for which he wears home oxygen at night. Per flight crew, who picked up the patient from EMS, the patient had sudden onset of right sided weakness, right facial droop, and slurred speech tonight at 1830. However, when calling family, his daughter reports that patient has had worsening generalized weakness and slurred, garbled speech since 2 nights ago. Additionally, he has had to wear more oxygen than normal and has an increase in SOA. Patient denies any headache, numbness, vision changes, chest pain, or any other acute sx at this time.        Patient is a 66 y.o. male presenting with neurologic complaint.   History provided by:  Patient, relative and EMS personnel (daughter)  Neurologic Problem   The patient's primary symptoms include focal weakness, slurred speech and weakness. This is a new problem. The current episode started today. The last time the patient was known to be well was 1/22/2017 10:35 PM (Sunday evening).  The problem has been gradually worsening since onset. There was right-sided, upper extremity and lower extremity focality noted. Associated symptoms include fatigue and shortness of breath. Pertinent negatives include no abdominal pain, back pain, chest pain, confusion, fever or headaches. His past medical history is significant for a CVA.       Review of Systems   Constitutional: Positive for fatigue. Negative for chills and fever.   HENT: Negative for congestion and sore throat.    Respiratory: Positive for shortness of breath.    Cardiovascular: Negative for chest pain.   Gastrointestinal: Negative for abdominal pain.   Genitourinary: Negative for dysuria.   Musculoskeletal: Negative for back pain.   Skin: Negative for rash.   Neurological: Positive for focal weakness, speech difficulty and weakness. Negative for  headaches.   Psychiatric/Behavioral: Negative for agitation and confusion.   All other systems reviewed and are negative.      Past Medical History   Diagnosis Date   • COPD (chronic obstructive pulmonary disease)    • Hypertension    • Stroke      pt's family states pt had stroke on 2009 that effected right side of body and pt's speech.       No Known Allergies    Past Surgical History   Procedure Laterality Date   • Abdominal surgery         History reviewed. No pertinent family history.    Social History     Social History   • Marital status: Single     Spouse name: N/A   • Number of children: N/A   • Years of education: N/A     Social History Main Topics   • Smoking status: Current Every Day Smoker     Packs/day: 1.00   • Smokeless tobacco: None   • Alcohol use No   • Drug use: No   • Sexual activity: Not Asked     Other Topics Concern   • None     Social History Narrative   • None         Objective   Physical Exam   Constitutional: He is oriented to person, place, and time. He appears well-developed and well-nourished.  Non-toxic appearance. No distress.   HENT:   Head: Normocephalic and atraumatic.   Right Ear: External ear normal.   Left Ear: External ear normal.   Nose: Nose normal.   Eyes: EOM and lids are normal. Pupils are equal, round, and reactive to light.   Neck: Normal range of motion. Neck supple. No tracheal deviation present.   Cardiovascular: Normal rate, regular rhythm and normal heart sounds.  Exam reveals no gallop, no friction rub and no decreased pulses.    No murmur heard.  Pulmonary/Chest: He is in respiratory distress (mild, tachypnea, hypoxia). He has no decreased breath sounds. He has wheezes (diffuse expiratory and inspiratory). He has no rhonchi. He has no rales.   Abdominal: Soft. Normal appearance and bowel sounds are normal. There is no tenderness. There is no rebound and no guarding.   Musculoskeletal: Normal range of motion. He exhibits no deformity.   Lymphadenopathy:     He has  no cervical adenopathy.   Neurological: He is oriented to person, place, and time. He has normal strength. No cranial nerve deficit or sensory deficit.   Garbled speech. Mildly somnolent. Diffusely fatigued, no focal neuro deficits.    Skin: Skin is warm and dry. No rash noted. He is not diaphoretic.   Psychiatric: He has a normal mood and affect. His speech is normal and behavior is normal. Judgment and thought content normal. Cognition and memory are normal.   Nursing note and vitals reviewed.      Critical Care  Performed by: ZAINAB BOONE  Authorized by: ZAINAB BOONE   Total critical care time: 35 minutes  Critical care was necessary to treat or prevent imminent or life-threatening deterioration of the following conditions: respiratory failure.  Critical care was time spent personally by me on the following activities: evaluation of patient's response to treatment, ordering and performing treatments and interventions, examination of patient, ordering and review of laboratory studies, re-evaluation of patient's condition, development of treatment plan with patient or surrogate, obtaining history from patient or surrogate, ordering and review of radiographic studies and discussions with consultants.               ED Course  ED Course       Course of Care      Lab Results (last 24 hours)     Procedure Component Value Units Date/Time    CBC & Differential [[id number]] Collected:  01/24/17 2245    Specimen:  Blood Updated:  01/24/17 2342    Narrative:       The following orders were created for panel order CBC & Differential.  Procedure                               Abnormality         Status                     ---------                               -----------         ------                     CBC Auto Differential[[id number]]         Abnormal            Final result                 Please view results for these tests on the individual orders.    Comprehensive Metabolic Panel [[id number]]  (Abnormal)  Collected:  01/24/17 2245    Specimen:  Blood from Arm, Left Updated:  01/24/17 2327     Glucose 92 mg/dL      BUN 14 mg/dL      Creatinine 0.80 mg/dL      Sodium 144 mmol/L      Potassium 4.3 mmol/L      Chloride 102 mmol/L      CO2 39.0 (H) mmol/L      Calcium 10.2 mg/dL      Total Protein 8.2 g/dL      Albumin 4.60 g/dL      ALT (SGPT) 13 U/L      AST (SGOT) 18 U/L      Alkaline Phosphatase 95 U/L      Total Bilirubin 0.5 mg/dL      eGFR Non African Amer 96 mL/min/1.73      eGFR  African Amer 117 mL/min/1.73      Globulin 3.6 gm/dL      A/G Ratio 1.3 (L) g/dL      BUN/Creatinine Ratio 17.5      Anion Gap 3.0 mmol/L     Narrative:       [employer] Guidelines    Stage                           Description                             GFR                      1                               Normal or High                          90+  2                               Mild decrease                            60-89  3                               Moderate decrease                   30-59  4                               Severe decrease                       15-29  5                               Kidney failure                             <15    CBC Auto Differential [[id number]]  (Abnormal) Collected:  01/24/17 2245    Specimen:  Blood from Arm, Left Updated:  01/24/17 2342     WBC 15.06 (H) 10*3/mm3      RBC 4.82 10*6/mm3      Hemoglobin 13.6 g/dL      Hematocrit 44.0 %      MCV 91.3 fL      MCH 28.2 pg      MCHC 30.9 (L) g/dL      RDW 15.6 (H) %      RDW-SD 52.3 fl      MPV 10.1 fL      Platelets 279 10*3/mm3      Neutrophil % 73.4 (H) %      Lymphocyte % 13.9 (L) %      Monocyte % 10.3 %      Eosinophil % 1.9 %      Basophil % 0.2 %      Immature Grans % 0.3 %      Neutrophils, Absolute 11.05 (H) 10*3/mm3      Lymphocytes, Absolute 2.09 10*3/mm3      Monocytes, Absolute 1.55 (H) 10*3/mm3      Eosinophils, Absolute 0.29 10*3/mm3      Basophils, Absolute 0.03 10*3/mm3      Immature Grans, Absolute 0.05  (H) 10*3/mm3     POC Protime / INR [[id number]]  (Normal) Collected:  01/24/17 2246    Specimen:  Blood Updated:  01/24/17 2255     Protime 12.8 seconds      INR 1.1       Serial Number: [id number]    : [id number]       POC CHEM 8 [[id number]]  (Abnormal) Collected:  01/24/17 2247    Specimen:  Blood Updated:  01/24/17 2255     Glucose 96 mg/dL      BUN, Arterial 16 mg/dL      Creatinine 0.80 mg/dL      Sodium, Arterial 142 mmol/L      Potassium, Arterial 4.3 mmol/L      Chloride, Arterial 99 mmol/L      Total CO2 40 (H) mmol/L      Hemoglobin 14.6 g/dL       Serial Number: [id number]    : [id number]        Hematocrit 43 %      Ionized Calcium, Arterial 1.16 (L) mmol/L     POC Troponin, Rapid [[id number]]  (Normal) Collected:  01/24/17 2328    Specimen:  Blood Updated:  01/24/17 2345     Troponin I 0.00 ng/mL       Serial Number: [id number]    : [id number]       Blood Gas, Arterial [[id number]]  (Abnormal) Collected:  01/24/17 2344    Specimen:  Arterial Blood Updated:  01/24/17 2351     Site Arterial: right radial      Nabil's Test N/A      pH, Arterial 7.353 pH units      pCO2, Arterial 65.1 (H) mm Hg      pO2, Arterial 64.5 (L) mm Hg      HCO3, Arterial 36.2 (H) mmol/L      Base Excess, Arterial 9.4 (H) mmol/L      O2 Saturation, Arterial 89.8 %      Hemoglobin, Blood Gas 12.5 (L) g/dL      Hematocrit, Blood Gas 38.3 %      Oxyhemoglobin 89.8 (L) %      Methemoglobin 0.4 %      Carboxyhemoglobin 2.6 (H) %      CO2 Content 38.2 (H)      Barometric Pressure for Blood Gas -- mmHg       N/A        Modality Cannula - Nasal      FIO2 32 %      Flow Rate 3 lpm           Note: In addition to lab results from this visit, the labs listed above may include labs taken at another facility or during a different encounter within the last 24 hours. Please correlate lab times with ED admission and discharge times for further clarification of the services performed during this visit.    CT Cerebral Perfusion With & Without Contrast    Final Result   Abnormal     Symmetric perfusion without evidence of acute infarction or ischemia.     Further evaluation with diffusion weighted MRI as clinically warranted.         THIS DOCUMENT HAS BEEN ELECTRONICALLY SIGNED BY BRIANNE QUEZADA MD      CT Head Without Contrast   Final Result   Abnormal     No acute intracranial abnormality.        If clinical concern persists, MRI may be considered.         THIS DOCUMENT HAS BEEN ELECTRONICALLY SIGNED BY BRIANNE QUEZADA MD      XR Chest 1 View    (Results Pending)       Vitals:    01/24/17 2301 01/24/17 2330 01/24/17 2333 01/24/17 2354   BP: 122/75 142/76     Patient Position:       Pulse: 77 82     Resp: 26   24   Temp: 99.6 °F (37.6 °C)      TempSrc: Oral      SpO2: 99% 93% 93%    Weight:       Height:           Medications   sodium chloride 0.9 % flush 10 mL (not administered)   albuterol (PROVENTIL) nebulizer solution 10 mg (10 mg Nebulization New Bag 1/24/17 2350)   sodium chloride 0.9 % flush 10 mL (not administered)   iopamidol (ISOVUE-370) 76 % injection 50 mL (40 mL Intravenous Given 1/24/17 2232)   predniSONE (DELTASONE) tablet 60 mg (60 mg Oral Given 1/24/17 2330)       ECG/EMG Results (last 24 hours)     ** No results found for the last 24 hours. **                        MDM  Number of Diagnoses or Management Options  COPD exacerbation: new and requires workup  Fatigue, unspecified type: new and requires workup  Hypercapnia: new and requires workup  Hypoxia: new and requires workup  Diagnosis management comments: Confirmed with daughter of patient that patient has been having symptoms since Sunday night, approximately 3 days, that include dyspnea, increased cough, increased oxygen demand, and increased fatigue. Also included garbled speech.     CT head negative, CT perfusion of head negative.     ABG shows elevated pCO2 and decrease pO2, A/A given, prednisone given, and bipap started.     Dr. Suarez informed and will admit.        Amount and/or Complexity of Data  Reviewed  Clinical lab tests: ordered and reviewed  Tests in the radiology section of CPT®: ordered and reviewed  Obtain history from someone other than the patient: yes  Review and summarize past medical records: yes  Discuss the patient with other providers: yes  Independent visualization of images, tracings, or specimens: yes    Risk of Complications, Morbidity, and/or Mortality  Presenting problems: high  Diagnostic procedures: high  Management options: high    Critical Care  Total time providing critical care: 30-74 minutes    Patient Progress  Patient progress: stable      Final diagnoses:   COPD exacerbation   Hypoxia   Hypercapnia   Fatigue, unspecified type       Documentation assistance provided by jessica Ma.  Information recorded by the jessica was done at my direction and has been verified and validated by me.     Ashley Ma  01/24/17 0272       Jose Luong MD  01/25/17 0034

## 2017-01-25 NOTE — PROGRESS NOTES
"Adult Nutrition  Assessment/PES    Patient Name:  Nam Alvarez  YOB: 1950  MRN: [id number]  Admit Date:  1/24/2017    Assessment Date:  1/25/2017  Per dtr poor PO intake and gradual weight loss since CVA in 2009.  Pt states his PCP started him on medication to help with his appetite, but does not know the name of it.  Pt noted on Remron per home meds report.  Would like to try Ensure or Boost and possible start @ home if pt likes it.  Will send with meals.  Monitor PO intake during hospital course, pt might benefit from Megace it continues to have decreased intake during hospital stay (long term goal to improve appetite).       Reason for Assessment       01/25/17 1238    Reason for Assessment    Reason For Assessment/Visit identified at risk by screening criteria    Identified At Risk By Screening Criteria unintentional loss of 10 lbs or more in the past 2 mos    Time Spent (min) 30    Diagnosis Diagnosis    Cardiac HTN    Pulmonary/Critical Care Acute respiratory failure;COPD   COPD with exacerbation. Bronchitis.               Nutrition/Diet History       01/25/17 1243    Nutrition/Diet History    Typical Food/Fluid Intake picks at food.     Factors Affecting Nutritional Intake Factors    Reported/Observed By Patient    Other dtr @ bedside and reports overall poor PO intake.  worse in the past two days whicl alerted her that he was not feeling well.  Signficant weight loss since CVA in 09 per dtr.             Anthropometrics       01/25/17 1252    Anthropometrics    Height Method Stated    Height 177.8 cm (70\")    Weight Method Stated    Weight 67.6 kg (149 lb)    Ideal Body Weight (IBW)    Ideal Body Weight (IBW), Male (kg) 76.48    % Ideal Body Weight 88.56    Usual Body Weight (UBW)    Usual Body Weight 90.7 kg (200 lb)    % Usual Body Weight 74.5    % Usual Body Weight Malnutrition 70-79% -moderate deficit    Weight Loss 23.1 kg (51 lb)    % Weight Loss  25.5 %    Weight Loss Time Frame 3 " years (gradual loss per dtr)     Body Mass Index (BMI)    BMI (kg/m2) 21.42            Labs/Tests/Procedures/Meds       01/25/17 1253    Labs/Tests/Procedures/Meds    Labs/Tests Review Reviewed    Medication Review Reviewed, pertinent                Nutrition Prescription Ordered       01/25/17 1253    Nutrition Prescription PO    Current PO Diet Regular    Common Modifiers Cardiac            Evaluation of Received Nutrient/Fluid Intake       01/25/17 1256    PO Evaluation    Number of Days PO Intake Evaluated Insufficient Data              Problem/Interventions:        Problem 1       01/25/17 1257    Nutrition Diagnoses Problem 1    Problem 1 Unintended Weight Loss    Etiology (related to) Medical Diagnosis    Pulmonary/Critical Care COPD   with exacerbation.     Signs/Symptoms (evidenced by) Unintended Weight Change    Unintended Weight Change Loss    Number of Pounds Lost 51    Weight loss time period 3 years.             Problem 2       01/25/17 1257    Nutrition Diagnoses Problem 2    Problem 2 Inadequate Nutrient Intake    Etiology (related to) --   clinical condition.     Signs/Symptoms (evidenced by) Report of Minimal PO Intake                  Intervention Goal       01/25/17 1258    Intervention Goal    General Nutrition support treatment    PO Establish PO            Nutrition Intervention       01/25/17 1258    Nutrition Intervention    RD/Tech Action Interview for preference;Supplement provided;Follow Tx progress;Encourage intake            Nutrition Prescription       01/25/17 1258    Nutrition Prescription PO    PO Prescription Begin/change supplement    Supplement Boost Plus    Supplement Frequency 3 times a day    New PO Prescription Ordered? Yes            Education/Evaluation       01/25/17 1259    Monitor/Evaluation    Monitor Per protocol;PO intake;Supplement intake;Pertinent labs        Comments:      Electronically signed by:  Francisca Jimenez RD  01/25/17 12:59 PM

## 2017-01-25 NOTE — PLAN OF CARE
Problem: Respiratory Insufficiency (Adult)  Goal: Identify Related Risk Factors and Signs and Symptoms  Outcome: Ongoing (interventions implemented as appropriate)  Goal: Acid/Base Balance  Outcome: Ongoing (interventions implemented as appropriate)  Goal: Effective Ventilation  Outcome: Ongoing (interventions implemented as appropriate)    Problem: Fall Risk (Adult)  Goal: Identify Related Risk Factors and Signs and Symptoms  Outcome: Ongoing (interventions implemented as appropriate)  Goal: Absence of Falls  Outcome: Ongoing (interventions implemented as appropriate)    Problem: Pressure Ulcer Risk (Aleks Scale) (Adult,Obstetrics,Pediatric)  Goal: Identify Related Risk Factors and Signs and Symptoms  Outcome: Ongoing (interventions implemented as appropriate)  Goal: Skin Integrity  Outcome: Ongoing (interventions implemented as appropriate)

## 2017-01-25 NOTE — H&P
"    [facility]  HISTORY AND PHYSICAL    Primary Care Physician: No Known Provider    Subjective     Chief Complaint:  dyspnea    History of Present Illness:   65 y/o male who is unable to give me hx secondary to bipap/respiratory status/CO2 narcosis; reportedly flown in by EMS for code 19 for garbled speech which is now felt to be secondary to his confusion and worsening respiratory status.  Family not in room.        Review of Systems   Unable to perform ROS: Mental status change      Otherwise complete ROS performed and negative except as mentioned in the HPI.    Past Medical History:   Past Medical History   Diagnosis Date   • COPD (chronic obstructive pulmonary disease)    • Hypertension    • Stroke      pt's family states pt had stroke on 2009 that effected right side of body and pt's speech.       Past Surgical History:  Past Surgical History   Procedure Laterality Date   • Abdominal surgery         Family History: family history is not on file.    Social History:  reports that he has been smoking.  He has been smoking about 1.00 pack per day. He does not have any smokeless tobacco history on file. He reports that he does not drink alcohol or use illicit drugs.    Medications:    (Not in a hospital admission)    Allergies:  No Known Allergies      Objective     Physical Exam:  Vital Signs:   Visit Vitals   • /77   • Pulse 92   • Temp 99.6 °F (37.6 °C) (Oral)   • Resp 24   • Ht 70\" (177.8 cm)   • Wt 176 lb 5.9 oz (80 kg)   • SpO2 98%   • BMI 25.31 kg/m2     Physical Exam  Gen; drowsy, moaning, initially refusing bipap but now has in place; tachypneic w/ abd breathing  Heent; bipap in place  Cv; rr w/ tachycardia, no mrg  L; tight wheeze t/o, use of accessory muscles, moaning  Abd; soft, +bs, sl distended, nontender  Ext; trace ble pitting edema  Skin; cdi, warm  Neuro; unable to assess         Results Reviewed:    Results from last 7 days  Lab Units 01/24/17  9588 " 01/24/17  2245   WBC 10*3/mm3  --  15.06*   HEMOGLOBIN g/dL  --  13.6   HEMOGLOBIN, ARTERIAL POC g/dL 14.6  --    PLATELETS 10*3/mm3  --  279       Results from last 7 days  Lab Units 01/24/17  2247 01/24/17  2245   SODIUM mmol/L  --  144   POTASSIUM mmol/L  --  4.3   TOTAL CO2 mmol/L  --  39.0*   CREATININE mg/dL 0.80 0.80   GLUCOSE mg/dL  --  92   CALCIUM mg/dL  --  10.2       I have personally reviewed and interpreted available lab data, radiology studies and ECG obtained at time of admission.     Assessment / Plan     Assessment/Problem List:   Active Problems:    COPD exacerbation    Acute hypercapnic respiratory failure    Tobacco abuse    Encephalopathy           65 y/o male w/  Hx of copd, ongoing tobacco abuse, home O2 use here w/   1. aecopd w/ acute hypoxic hypercapneic respiratory failure:  -cont bipap; scheduled nebs, abx, steroids; repeat abg 1 hour; family not available   2. Tobacco abuse; leah patch; unable to discuss cessation w/ pt  3. ?encephalopathy; head ct and ct perfusion negative per ED; likely secondary to the above.            DVT prophylaxis:lovenox  Code Status:unable to ask  Admission Status: Patient will be admitted to  INPATIENT status due to the need for care which can only be reasonably provided in an hospital setting such as aggressive/expedited ancillary services and/or consultation services and the necessity for IV medications, close physician monitoring and/or the possible need for procedures.  In such, I feel patient’s risk for adverse outcomes and need for care warrant INPATIENT evaluation and predict the patient’s care encounter to likely last beyond 2 midnights.     Maylin Suarez MD 01/25/17 1:25 AM

## 2017-01-25 NOTE — PROGRESS NOTES
"Hospitalist Daily Progress Note    Date of Admission: 1/24/2017   LOS: 0 days   PCP: No Known Provider    Chief Complaint: AMS, dyspnea    Subjective   History of Present Illness     Awake and alert. Cough with yellow sputum. No f/c. Notes wheezes. Just tired/weak    Review of Systems   Constitutional: Negative for chills.   HENT: Positive for congestion.    Respiratory: Positive for cough and wheezing.    Cardiovascular: Negative for leg swelling.         Objective   Physical Exam:  I have reviewed the vital signs.  Temp:  [97.7 °F (36.5 °C)-99.6 °F (37.6 °C)] 97.7 °F (36.5 °C)  Heart Rate:  [] 72  Resp:  [20-26] 20  BP: ()/(56-86) 92/58    Objective:  General Appearance:  Comfortable.    Vital signs: (most recent): Blood pressure 99/74, pulse 83, temperature 97.7 °F (36.5 °C), temperature source Axillary, resp. rate 20, height 70\" (177.8 cm), weight 149 lb (67.6 kg), SpO2 92 %.  Vital signs are normal.    Lungs:  Normal effort.  He is not in respiratory distress.  There are wheezes and rhonchi.    Heart: Normal rate.  Regular rhythm.  S1 normal and S2 normal.  No murmur, gallop or friction rub.   Abdomen: Abdomen is soft and non-distended.  Bowel sounds are normal.     Extremities: (R<L strength, baseline per report)  Pulses: Distal pulses are intact.    Neurological: Patient is alert.  (Confused to time, place).    Skin:  Warm and dry.          NAD  Alert and oriented   OP Dry  Coarse bilaterally, occ exp wheeze  RRR  +BS, ND, NT  No c/c/e  Normal affect  SCHREIBER    Results Review:    I have reviewed the labs, radiology results and diagnostic studies.      Results from last 7 days  Lab Units 01/25/17  0323   WBC 10*3/mm3 15.33*   HEMOGLOBIN g/dL 11.7*   PLATELETS 10*3/mm3 247       Results from last 7 days  Lab Units 01/24/17  2247 01/24/17  2245   SODIUM mmol/L  --  144   POTASSIUM mmol/L  --  4.3   TOTAL CO2 mmol/L  --  39.0*   CREATININE mg/dL 0.80 0.80   GLUCOSE mg/dL  --  92       I have reviewed the " medications.    ---------------------------------------------------------------------------------------------  Assessment/Plan   Assessment & Plan  Assessment/Problem List    Active Problems:    COPD exacerbation    Acute hypercapnic respiratory failure    Tobacco abuse    Encephalopathy       Plan    [facility]irlifted to [facility] ED after experiencing sudden right sided weakness, facial droop, respiratory failure. CTA with/without is negative.On bipap for hypercapnia.    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    Liliam Strange RN 01/25/17 9:42 AM

## 2017-01-26 PROBLEM — I63.9 STROKE (HCC): Status: ACTIVE | Noted: 2017-01-26

## 2017-01-26 LAB
ANION GAP SERPL CALCULATED.3IONS-SCNC: 1 MMOL/L (ref 3–11)
BASOPHILS # BLD AUTO: 0.01 10*3/MM3 (ref 0–0.2)
BASOPHILS NFR BLD AUTO: 0.1 % (ref 0–1)
BUN BLD-MCNC: 17 MG/DL (ref 9–23)
BUN/CREAT SERPL: 28.3 (ref 7–25)
CALCIUM SPEC-SCNC: 9.6 MG/DL (ref 8.7–10.4)
CHLORIDE SERPL-SCNC: 100 MMOL/L (ref 99–109)
CO2 SERPL-SCNC: 37 MMOL/L (ref 20–31)
CREAT BLD-MCNC: 0.6 MG/DL (ref 0.6–1.3)
DEPRECATED RDW RBC AUTO: 50.3 FL (ref 37–54)
EOSINOPHIL # BLD AUTO: 0 10*3/MM3 (ref 0.1–0.3)
EOSINOPHIL NFR BLD AUTO: 0 % (ref 0–3)
ERYTHROCYTE [DISTWIDTH] IN BLOOD BY AUTOMATED COUNT: 15.6 % (ref 11.3–14.5)
GFR SERPL CREATININE-BSD FRML MDRD: 134 ML/MIN/1.73
GLUCOSE BLD-MCNC: 118 MG/DL (ref 70–100)
HCT VFR BLD AUTO: 33.2 % (ref 38.9–50.9)
HGB BLD-MCNC: 10.3 G/DL (ref 13.1–17.5)
IMM GRANULOCYTES # BLD: 0.04 10*3/MM3 (ref 0–0.03)
IMM GRANULOCYTES NFR BLD: 0.3 % (ref 0–0.6)
LYMPHOCYTES # BLD AUTO: 1.3 10*3/MM3 (ref 0.6–4.8)
LYMPHOCYTES NFR BLD AUTO: 8.8 % (ref 24–44)
MCH RBC QN AUTO: 27.7 PG (ref 27–31)
MCHC RBC AUTO-ENTMCNC: 31 G/DL (ref 32–36)
MCV RBC AUTO: 89.2 FL (ref 80–99)
MONOCYTES # BLD AUTO: 1.46 10*3/MM3 (ref 0–1)
MONOCYTES NFR BLD AUTO: 9.9 % (ref 0–12)
NEUTROPHILS # BLD AUTO: 11.92 10*3/MM3 (ref 1.5–8.3)
NEUTROPHILS NFR BLD AUTO: 80.9 % (ref 41–71)
PLATELET # BLD AUTO: 214 10*3/MM3 (ref 150–450)
PMV BLD AUTO: 9.5 FL (ref 6–12)
POTASSIUM BLD-SCNC: 3.7 MMOL/L (ref 3.5–5.5)
RBC # BLD AUTO: 3.72 10*6/MM3 (ref 4.2–5.76)
SODIUM BLD-SCNC: 138 MMOL/L (ref 132–146)
WBC NRBC COR # BLD: 14.73 10*3/MM3 (ref 3.5–10.8)

## 2017-01-26 PROCEDURE — 63710000001 PREDNISONE PER 5 MG: Performed by: HOSPITALIST

## 2017-01-26 PROCEDURE — 85025 COMPLETE CBC W/AUTO DIFF WBC: CPT | Performed by: HOSPITALIST

## 2017-01-26 PROCEDURE — 94640 AIRWAY INHALATION TREATMENT: CPT

## 2017-01-26 PROCEDURE — 94799 UNLISTED PULMONARY SVC/PX: CPT

## 2017-01-26 PROCEDURE — 99233 SBSQ HOSP IP/OBS HIGH 50: CPT | Performed by: FAMILY MEDICINE

## 2017-01-26 PROCEDURE — 80048 BASIC METABOLIC PNL TOTAL CA: CPT | Performed by: HOSPITALIST

## 2017-01-26 PROCEDURE — 87070 CULTURE OTHR SPECIMN AEROBIC: CPT | Performed by: HOSPITALIST

## 2017-01-26 PROCEDURE — 87205 SMEAR GRAM STAIN: CPT | Performed by: HOSPITALIST

## 2017-01-26 PROCEDURE — 25010000002 ENOXAPARIN PER 10 MG: Performed by: NURSE PRACTITIONER

## 2017-01-26 PROCEDURE — 94760 N-INVAS EAR/PLS OXIMETRY 1: CPT

## 2017-01-26 RX ADMIN — DOCUSATE SODIUM AND SENNOSIDES 2 TABLET: 8.6; 5 TABLET, FILM COATED ORAL at 17:25

## 2017-01-26 RX ADMIN — NICOTINE 1 PATCH: 21 PATCH, EXTENDED RELEASE TRANSDERMAL at 05:19

## 2017-01-26 RX ADMIN — PREDNISONE 40 MG: 20 TABLET ORAL at 08:46

## 2017-01-26 RX ADMIN — POTASSIUM CHLORIDE 20 MEQ: 750 CAPSULE, EXTENDED RELEASE ORAL at 08:45

## 2017-01-26 RX ADMIN — BACLOFEN 10 MG: 10 TABLET ORAL at 08:46

## 2017-01-26 RX ADMIN — IPRATROPIUM BROMIDE AND ALBUTEROL SULFATE 3 ML: .5; 3 SOLUTION RESPIRATORY (INHALATION) at 19:14

## 2017-01-26 RX ADMIN — DOXYCYCLINE HYCLATE 100 MG: 100 CAPSULE, GELATIN COATED ORAL at 20:24

## 2017-01-26 RX ADMIN — IPRATROPIUM BROMIDE AND ALBUTEROL SULFATE 3 ML: .5; 3 SOLUTION RESPIRATORY (INHALATION) at 11:57

## 2017-01-26 RX ADMIN — IPRATROPIUM BROMIDE AND ALBUTEROL SULFATE 3 ML: .5; 3 SOLUTION RESPIRATORY (INHALATION) at 07:28

## 2017-01-26 RX ADMIN — IPRATROPIUM BROMIDE AND ALBUTEROL SULFATE 3 ML: .5; 3 SOLUTION RESPIRATORY (INHALATION) at 23:50

## 2017-01-26 RX ADMIN — ENOXAPARIN SODIUM 40 MG: 40 INJECTION SUBCUTANEOUS at 08:45

## 2017-01-26 RX ADMIN — BACLOFEN 10 MG: 10 TABLET ORAL at 17:25

## 2017-01-26 RX ADMIN — DOCUSATE SODIUM AND SENNOSIDES 2 TABLET: 8.6; 5 TABLET, FILM COATED ORAL at 08:46

## 2017-01-26 RX ADMIN — HYDROCODONE BITARTRATE AND ACETAMINOPHEN 1 TABLET: 7.5; 325 TABLET ORAL at 08:45

## 2017-01-26 RX ADMIN — MONTELUKAST SODIUM 10 MG: 10 TABLET, FILM COATED ORAL at 20:24

## 2017-01-26 RX ADMIN — MIRTAZAPINE 15 MG: 15 TABLET, FILM COATED ORAL at 20:24

## 2017-01-26 RX ADMIN — HYDROCODONE BITARTRATE AND ACETAMINOPHEN 1 TABLET: 7.5; 325 TABLET ORAL at 18:28

## 2017-01-26 RX ADMIN — IPRATROPIUM BROMIDE AND ALBUTEROL SULFATE 3 ML: .5; 3 SOLUTION RESPIRATORY (INHALATION) at 02:50

## 2017-01-26 RX ADMIN — IPRATROPIUM BROMIDE AND ALBUTEROL SULFATE 3 ML: .5; 3 SOLUTION RESPIRATORY (INHALATION) at 15:29

## 2017-01-26 RX ADMIN — DOXYCYCLINE HYCLATE 100 MG: 100 CAPSULE, GELATIN COATED ORAL at 08:45

## 2017-01-26 RX ADMIN — CITALOPRAM HYDROBROMIDE 20 MG: 20 TABLET ORAL at 08:46

## 2017-01-26 RX ADMIN — TRAZODONE HYDROCHLORIDE 50 MG: 50 TABLET ORAL at 20:24

## 2017-01-26 NOTE — PROGRESS NOTES
"    [facility]  INPATIENT PROGRESS NOTE    Date of Admission: 1/24/2017  Length of Stay: 1  Primary Care Physician: No Known Provider    Subjective     Chief Complaint:   Hypoxia/hypercapnea, SOA    HPI:  \"I'm ready to go home\".  Is on baseline O2, denies dyspnea at rest. Mentation at baseline per daughter.  Pt needs to mobilize to ensure has not lost any of his fragile baseline function given hx CVA with residual right HP, normally uses walker and gait belt with help from family.  Will get him up today and assess.     Review Of Systems:   Review of Systems   Constitutional: Negative for fever.   Respiratory: Positive for cough and shortness of breath. Negative for chest tightness and wheezing.    Cardiovascular: Negative for chest pain and leg swelling.   All other systems reviewed and are negative.        Objective      Temp:  [97.1 °F (36.2 °C)-98.6 °F (37 °C)] 97.1 °F (36.2 °C)  Heart Rate:  [65-98] 69  Resp:  [18-20] 18  BP: ()/(52-70) 111/70  Physical Exam  Temp:  [97.1 °F (36.2 °C)-98.6 °F (37 °C)] 97.1 °F (36.2 °C)  Heart Rate:  [65-98] 69  Resp:  [18-20] 18  BP: ()/(52-70) 111/70  Constitutional: no acute distress, awake, alert  Respiratory: Clear to auscultation bilaterally, no wheezes currently, nonlabored respirations   Cardiovascular: RRR  Gastrointestinal: Positive bowel sounds, soft, nontender, nondistended  Musculoskeletal: No bilateral ankle edema  Psychiatric: oriented x 3, appropriate affect, cooperative  Neurologic: Strength symmetric in all extremities       Results Review:    I have reviewed the labs, radiology results and diagnostic studies.      Results from last 7 days  Lab Units 01/26/17  0613   WBC 10*3/mm3 14.73*   HEMOGLOBIN g/dL 10.3*   PLATELETS 10*3/mm3 214       Results from last 7 days  Lab Units 01/26/17  0613   SODIUM mmol/L 138   POTASSIUM mmol/L 3.7   TOTAL CO2 mmol/L 37.0*   CREATININE mg/dL 0.60   GLUCOSE mg/dL 118* "       Culture Data:   BLOOD CULTURE   Date Value Ref Range Status   01/25/2017 No growth at 24 hours  Preliminary   01/25/2017 No growth at 24 hours  Preliminary     Radiology Data:  Reviewed    I have reviewed the medications.    Assessment/Plan     Assessment/Problem List  Active Problems:    COPD exacerbation    Acute hypercapnic respiratory failure    Tobacco abuse    Metabolic and hypoxic/hypercapneic encephalopathy    Hx of Stroke with residual right HP           Plan    - at baseline home O2 at 2-3L, symptoms much improved on Doxy and prednisone.  At d/c plan to finish 7 additional days Doxy and taper steroids over ~10 days  - PT to see and assess if can do his baseline activity (walk with walker with contact guard assist using gait belt) since plan is home tomorrow with his family  - noted pt was put on ventimask overnight due to hypoxia while asleep, pt is mouthbreather per daughter and likely this is chronic.  Advised daughter if happens again tonight suggest she increases his home qhs O2 to 4L for better passive O2 concentration via the NC even if he mouth breaths.  Pt needs referral for sleep study at d/c!        DVT prophylaxis: Lovenox  Discharge Planning: I expect patient to be discharged tomorrow am    Naida Arzola MD   01/26/17   12:05 PM    Please note that portions of this note may have been completed with a voice recognition program. Efforts were made to edit the dictations, but occasionally words are mistranscribed.

## 2017-01-26 NOTE — PLAN OF CARE
Problem: Respiratory Insufficiency (Adult)  Goal: Identify Related Risk Factors and Signs and Symptoms  Outcome: Ongoing (interventions implemented as appropriate)    01/26/17 0311   Respiratory Insufficiency   Related Risk Factors (Respiratory Insufficiency) activity intolerance   Signs and Symptoms (Respiratory Insufficiency) abnormal ABGs;abnormal breath sounds;breathing pattern changes;shortness of breath;use of accessory muscles       Goal: Acid/Base Balance  Outcome: Ongoing (interventions implemented as appropriate)    01/26/17 0311   Respiratory Insufficiency (Adult)   Acid/Base Balance making progress toward outcome       Goal: Effective Ventilation  Outcome: Ongoing (interventions implemented as appropriate)    01/26/17 0311   Respiratory Insufficiency (Adult)   Effective Ventilation making progress toward outcome         Problem: Fall Risk (Adult)  Goal: Identify Related Risk Factors and Signs and Symptoms  Outcome: Ongoing (interventions implemented as appropriate)    01/26/17 0311   Fall Risk   Fall Risk: Related Risk Factors fatigue/slow reaction;gait/mobility problems;history of falls;environment unfamiliar       Goal: Absence of Falls  Outcome: Ongoing (interventions implemented as appropriate)    01/26/17 0311   Fall Risk (Adult)   Absence of Falls making progress toward outcome         Problem: Pressure Ulcer Risk (Alkes Scale) (Adult,Obstetrics,Pediatric)  Goal: Identify Related Risk Factors and Signs and Symptoms  Outcome: Ongoing (interventions implemented as appropriate)    01/26/17 0311   Pressure Ulcer Risk (Aleks Scale)   Related Risk Factors (Pressure Ulcer Risk (Aleks Scale)) mobility impaired       Goal: Skin Integrity  Outcome: Ongoing (interventions implemented as appropriate)    01/26/17 0311   Pressure Ulcer Risk (Aleks Scale) (Adult,Obstetrics,Pediatric)   Skin Integrity making progress toward outcome

## 2017-01-26 NOTE — DISCHARGE PLACEMENT REQUEST
"Eda Mathias (66 y.o. Male)   : RAMEZ MARIA [phone]    Date of Birth Social Security Number Address Home Phone MRN    [id number]  [id number]   [facility] [address] [phone] [id number]    Anabaptist Marital Status          None Single       Admission Date Admission Type Admitting Provider Attending Provider Department, Room/Bed    1/24/17 Emergency Naida Arzola MD Hall, Holly, MD [facility] 6B, N627/1    Discharge Date Discharge Disposition Discharge Destination                      Attending Provider: Naida Arzola MD     Allergies:  No Known Allergies    Isolation:  None   Infection:  None   Code Status:  FULL    Ht:  70\" (177.8 cm)   Wt:  149 lb (67.6 kg)    Admission Cmt:  None   Principal Problem:  None                Active Insurance as of 1/24/2017     Primary Coverage     Payor Plan Insurance Group Employer/Plan Group    MEDICARE MEDICARE A & B      Payor Plan Address Payor Plan Phone Number Effective From Effective To    [address] [phone] 3/1/2013     [place] [address]       Subscriber Name Subscriber Birth Date Member ID       EDA MATHIAS 1950 [id number]                 Emergency Contacts      (Rel.) Home Phone Work Phone Mobile Phone    Prema Mathias (Daughter) [phone] -- --        Inpatient Consult to Case Management  [[id number]] (Order [id number])   Consult    Date: 1/26/2017   Department: [address]   Ordering/Authorizing: Naida Arzola MD         Standing Order Information      Remaining Occurrences Interval Last Released              0/1 Once 1/26/2017          Order History  Inpatient     Date/Time Action Taken User Additional Information     01/26/17 1157 Sign Naida Arzola MD Modify from Order: [id number]     01/26/17 1157 Release Instance Naida Arzola MD (auto-released) Released Order: [id number]       Order Details      Frequency Duration Priority Order Class     Once 1  occurrence " Routine Hospital Performed       Order Questions      Question Answer Comment     Reason for Consult? resume HH orders at discharge.  D/c planned for tomorrow 1/27/17        Consult Order Info      ID Description Priority Start Date Start Time     [id number] Inpatient Consult to Case Management  Routine 01/26/2017 11:58 AM         Provider Specialty Referred to     ______________________________________ _____________________________________       Reprint Order Requisition      Inpatient Consult to Case Management [facility] (Order #[id number]) on 1/26/17       Encounter      View Encounter           Order Provider Info          Office phone Pager E-mail     Ordering User Naida Arzola [phone] -- --     Authorizing Provider Naida Arzola [phone] -- --     Attending Provider Naida Arzola [phone] -- --       Order Transmittal Tracking      Inpatient Consult to Case Management  (Order #[id number]) on 1/26/17                History & Physical      Maylin Suarez MD at 1/25/2017  1:25 AM              [facility] Medicine Services  HISTORY AND PHYSICAL    Primary Care Physician: No Known Provider    Subjective     Chief Complaint:  dyspnea    History of Present Illness:   67 y/o male who is unable to give me hx secondary to bipap/respiratory status/CO2 narcosis; reportedly flown in by EMS for code 19 for garbled speech which is now felt to be secondary to his confusion and worsening respiratory status.  Family not in room.        Review of Systems   Unable to perform ROS: Mental status change      Otherwise complete ROS performed and negative except as mentioned in the HPI.    Past Medical History:   Past Medical History   Diagnosis Date   • COPD (chronic obstructive pulmonary disease)    • Hypertension    • Stroke      pt's family states pt had stroke on 2009 that effected right side of body and pt's speech.       Past Surgical History:  Past Surgical  "History   Procedure Laterality Date   • Abdominal surgery         Family History: family history is not on file.    Social History:  reports that he has been smoking.  He has been smoking about 1.00 pack per day. He does not have any smokeless tobacco history on file. He reports that he does not drink alcohol or use illicit drugs.    Medications:    (Not in a hospital admission)    Allergies:  No Known Allergies      Objective     Physical Exam:  Vital Signs:   Visit Vitals   • /77   • Pulse 92   • Temp 99.6 °F (37.6 °C) (Oral)   • Resp 24   • Ht 70\" (177.8 cm)   • Wt 176 lb 5.9 oz (80 kg)   • SpO2 98%   • BMI 25.31 kg/m2     Physical Exam  Gen; drowsy, moaning, initially refusing bipap but now has in place; tachypneic w/ abd breathing  Heent; bipap in place  Cv; rr w/ tachycardia, no mrg  L; tight wheeze t/o, use of accessory muscles, moaning  Abd; soft, +bs, sl distended, nontender  Ext; trace ble pitting edema  Skin; cdi, warm  Neuro; unable to assess         Results Reviewed:    Results from last 7 days  Lab Units 01/24/17 2247 01/24/17  2245   WBC 10*3/mm3  --  15.06*   HEMOGLOBIN g/dL  --  13.6   HEMOGLOBIN, ARTERIAL POC g/dL 14.6  --    PLATELETS 10*3/mm3  --  279       Results from last 7 days  Lab Units 01/24/17 2247 01/24/17  2245   SODIUM mmol/L  --  144   POTASSIUM mmol/L  --  4.3   TOTAL CO2 mmol/L  --  39.0*   CREATININE mg/dL 0.80 0.80   GLUCOSE mg/dL  --  92   CALCIUM mg/dL  --  10.2       I have personally reviewed and interpreted available lab data, radiology studies and ECG obtained at time of admission.     Assessment / Plan     Assessment/Problem List:   Active Problems:    COPD exacerbation    Acute hypercapnic respiratory failure    Tobacco abuse    Encephalopathy           67 y/o male w/  Hx of copd, ongoing tobacco abuse, home O2 use here w/   1. aecopd w/ acute hypoxic hypercapneic respiratory failure:  -cont bipap; scheduled nebs, abx, steroids; repeat abg 1 hour; family not " available   2. Tobacco abuse; leah patch; unable to discuss cessation w/ pt  3. ?encephalopathy; head ct and ct perfusion negative per ED; likely secondary to the above.            DVT prophylaxis:lovenox  Code Status:unable to ask  Admission Status: Patient will be admitted to  INPATIENT status due to the need for care which can only be reasonably provided in an hospital setting such as aggressive/expedited ancillary services and/or consultation services and the necessity for IV medications, close physician monitoring and/or the possible need for procedures.  In such, I feel patient’s risk for adverse outcomes and need for care warrant INPATIENT evaluation and predict the patient’s care encounter to likely last beyond 2 midnights.     Maylin Suarez MD 01/25/17 1:25 AM           Electronically signed by Maylin Suarez MD at 1/25/2017  1:35 AM        Hospital Medications (active)       Dose Frequency Start End    acetaminophen (TYLENOL) tablet 650 mg 650 mg Every 4 Hours PRN 1/25/2017     Sig - Route: Take 2 tablets by mouth Every 4 (Four) Hours As Needed for mild pain (1-3). - Oral    baclofen (LIORESAL) tablet 10 mg 10 mg 2 Times Daily 1/25/2017     Sig - Route: Take 1 tablet by mouth 2 (Two) Times a Day. - Oral    bisacodyl (DULCOLAX) suppository 10 mg 10 mg Daily PRN 1/25/2017     Sig - Route: Insert 1 suppository into the rectum Daily As Needed for constipation. - Rectal    citalopram (CeleXA) tablet 20 mg 20 mg Daily 1/25/2017     Sig - Route: Take 1 tablet by mouth Daily. - Oral    doxycycline (VIBRAMYCIN) capsule 100 mg 100 mg Every 12 Hours Scheduled 1/25/2017     Sig - Route: Take 1 capsule by mouth Every 12 (Twelve) Hours. - Oral    enoxaparin (LOVENOX) syringe 40 mg 40 mg Daily 1/25/2017     Sig - Route: Inject 0.4 mL under the skin Daily. - Subcutaneous    HYDROcodone-acetaminophen (NORCO) 7.5-325 MG per tablet 1 tablet 1 tablet Every 8 Hours PRN 1/25/2017     Sig - Route: Take 1 tablet by mouth Every 8  "(Eight) Hours As Needed for moderate pain (4-6). - Oral    ipratropium-albuterol (DUO-NEB) nebulizer solution 3 mL 3 mL Every 4 Hours - RT 1/25/2017     Sig - Route: Take 3 mL by nebulization Every 4 (Four) Hours. - Nebulization    ipratropium-albuterol (DUO-NEB) nebulizer solution 3 mL 3 mL Every 4 Hours PRN 1/25/2017     Sig - Route: Take 3 mL by nebulization Every 4 (Four) Hours As Needed for shortness of air. - Nebulization    mirtazapine (REMERON) tablet 15 mg 15 mg Nightly 1/25/2017     Sig - Route: Take 1 tablet by mouth Every Night. - Oral    montelukast (SINGULAIR) tablet 10 mg 10 mg Nightly 1/25/2017     Sig - Route: Take 1 tablet by mouth Every Night. - Oral    nicotine (NICODERM CQ) 21 MG/24HR patch 1 patch 1 patch Every 24 Hours 1/25/2017     Sig - Route: Place 1 patch on the skin Daily. - Transdermal    ondansetron (ZOFRAN) injection 4 mg 4 mg Every 6 Hours PRN 1/25/2017     Sig - Route: Infuse 2 mL into a venous catheter Every 6 (Six) Hours As Needed for nausea or vomiting. - Intravenous    Linked Group 1:  \"Or\" Linked Group Details        ondansetron (ZOFRAN) tablet 4 mg 4 mg Every 6 Hours PRN 1/25/2017     Sig - Route: Take 1 tablet by mouth Every 6 (Six) Hours As Needed for nausea or vomiting. - Oral    Linked Group 1:  \"Or\" Linked Group Details        Pharmacy Consult - [facility]  Daily 1/25/2017     Sig - Route: Daily. - Does not apply    Pharmacy Meds to Bed Consult  User Specified 1/26/2017     Sig - Route: Once per day on Mon Tue Wed Thu Fri. - Does not apply    potassium chloride (MICRO-K) CR capsule 20 mEq 20 mEq Daily 1/25/2017     Sig - Route: Take 2 capsules by mouth Daily. - Oral    predniSONE (DELTASONE) tablet 40 mg 40 mg Daily With Breakfast 1/25/2017     Sig - Route: Take 2 tablets by mouth Daily With Breakfast. - Oral    sennosides-docusate sodium (SENOKOT-S) 8.6-50 MG tablet 2 tablet 2 tablet 2 Times Daily 1/25/2017     Sig - Route: Take 2 tablets by mouth 2 (Two) Times a Day. - Oral    " "sodium chloride 0.9 % flush 1-10 mL 1-10 mL As Needed 1/25/2017     Sig - Route: Infuse 1-10 mL into a venous catheter As Needed for line care. - Intravenous    sodium chloride 0.9 % flush 10 mL 10 mL As Needed 1/24/2017     Sig - Route: Infuse 10 mL into a venous catheter As Needed for line care. - Intravenous    Linked Group 2:  \"And\" Linked Group Details        sodium chloride 0.9 % flush 10 mL 10 mL As Needed 1/24/2017     Sig - Route: Infuse 10 mL into a venous catheter As Needed for line care. - Intravenous    Cosign for Ordering: Accepted by Jose Luong MD on 1/25/2017  3:15 AM    traZODone (DESYREL) tablet 50 mg 50 mg Nightly 1/25/2017     Sig - Route: Take 1 tablet by mouth Every Night. - Oral          Lab Results (last 24 hours)     Procedure Component Value Units Date/Time    Blood Culture [[id number]]  (Normal) Collected:  01/25/17 0323    Specimen:  Blood from Arm, Right Updated:  01/26/17 0501     Blood Culture No growth at 24 hours     Blood Culture [[id number]]  (Normal) Collected:  01/25/17 0323    Specimen:  Blood from Arm, Left Updated:  01/26/17 0501     Blood Culture No growth at 24 hours     CBC & Differential [[id number]] Collected:  01/26/17 0613    Specimen:  Blood Updated:  01/26/17 0708    Narrative:       The following orders were created for panel order CBC & Differential.  Procedure                               Abnormality         Status                     ---------                               -----------         ------                     CBC Auto Differential[[id number]]         Abnormal            Final result                 Please view results for these tests on the individual orders.    CBC Auto Differential [[id number]]  (Abnormal) Collected:  01/26/17 0613    Specimen:  Blood Updated:  01/26/17 0708     WBC 14.73 (H) 10*3/mm3      RBC 3.72 (L) 10*6/mm3      Hemoglobin 10.3 (L) g/dL      Hematocrit 33.2 (L) %      MCV 89.2 fL      MCH 27.7 pg      MCHC 31.0 (L) g/dL      RDW " "15.6 (H) %      RDW-SD 50.3 fl      MPV 9.5 fL      Platelets 214 10*3/mm3      Neutrophil % 80.9 (H) %      Lymphocyte % 8.8 (L) %      Monocyte % 9.9 %      Eosinophil % 0.0 %      Basophil % 0.1 %      Immature Grans % 0.3 %      Neutrophils, Absolute 11.92 (H) 10*3/mm3      Lymphocytes, Absolute 1.30 10*3/mm3      Monocytes, Absolute 1.46 (H) 10*3/mm3      Eosinophils, Absolute 0.00 (L) 10*3/mm3      Basophils, Absolute 0.01 10*3/mm3      Immature Grans, Absolute 0.04 (H) 10*3/mm3     Basic Metabolic Panel [[id number]]  (Abnormal) Collected:  01/26/17 0613    Specimen:  Blood Updated:  01/26/17 0731     Glucose 118 (H) mg/dL      BUN 17 mg/dL      Creatinine 0.60 mg/dL      Sodium 138 mmol/L      Potassium 3.7 mmol/L      Chloride 100 mmol/L      CO2 37.0 (H) mmol/L      Calcium 9.6 mg/dL      eGFR Non African Amer 134 mL/min/1.73      BUN/Creatinine Ratio 28.3 (H)      Anion Gap 1.0 (L) mmol/L     Narrative:       National Kid[employer] Guidelines    Stage                           Description                             GFR                      1                               Normal or High                          90+  2                               Mild decrease                            60-89  3                               Moderate decrease                   30-59  4                               Severe decrease                       15-29  5                               Kidney failure                             <15             Physician Progress Notes (last 24 hours) (Notes from 1/25/2017  1:12 PM through 1/26/2017  1:12 PM)      Naida Arzola MD at 1/26/2017 11:58 AM  Version 1 of 1             [facility] Medicine Services  INPATIENT PROGRESS NOTE    Date of Admission: 1/24/2017  Length of Stay: 1  Primary Care Physician: No Known Provider    Subjective     Chief Complaint:   Hypoxia/hypercapnea, SOA    HPI:  \"I'm ready to go home\".  Is on baseline O2, denies dyspnea at rest. " Mentation at baseline per daughter.  Pt needs to mobilize to ensure has not lost any of his fragile baseline function given hx CVA with residual right HP, normally uses walker and gait belt with help from family.  Will get him up today and assess.     Review Of Systems:   Review of Systems   Constitutional: Negative for fever.   Respiratory: Positive for cough and shortness of breath. Negative for chest tightness and wheezing.    Cardiovascular: Negative for chest pain and leg swelling.   All other systems reviewed and are negative.        Objective      Temp:  [97.1 °F (36.2 °C)-98.6 °F (37 °C)] 97.1 °F (36.2 °C)  Heart Rate:  [65-98] 69  Resp:  [18-20] 18  BP: ()/(52-70) 111/70  Physical Exam  Temp:  [97.1 °F (36.2 °C)-98.6 °F (37 °C)] 97.1 °F (36.2 °C)  Heart Rate:  [65-98] 69  Resp:  [18-20] 18  BP: ()/(52-70) 111/70  Constitutional: no acute distress, awake, alert  Respiratory: Clear to auscultation bilaterally, no wheezes currently, nonlabored respirations   Cardiovascular: RRR  Gastrointestinal: Positive bowel sounds, soft, nontender, nondistended  Musculoskeletal: No bilateral ankle edema  Psychiatric: oriented x 3, appropriate affect, cooperative  Neurologic: Strength symmetric in all extremities       Results Review:    I have reviewed the labs, radiology results and diagnostic studies.      Results from last 7 days  Lab Units 01/26/17  0613   WBC 10*3/mm3 14.73*   HEMOGLOBIN g/dL 10.3*   PLATELETS 10*3/mm3 214       Results from last 7 days  Lab Units 01/26/17  0613   SODIUM mmol/L 138   POTASSIUM mmol/L 3.7   TOTAL CO2 mmol/L 37.0*   CREATININE mg/dL 0.60   GLUCOSE mg/dL 118*       Culture Data:   BLOOD CULTURE   Date Value Ref Range Status   01/25/2017 No growth at 24 hours  Preliminary   01/25/2017 No growth at 24 hours  Preliminary     Radiology Data:  Reviewed    I have reviewed the medications.    Assessment/Plan     Assessment/Problem List  Active Problems:    COPD exacerbation    Acute  hypercapnic respiratory failure    Tobacco abuse    Metabolic and hypoxic/hypercapneic encephalopathy    Hx of Stroke with residual right HP           Plan    - at baseline home O2 at 2-3L, symptoms much improved on Doxy and prednisone.  At d/c plan to finish 7 additional days Doxy and taper steroids over ~10 days  - PT to see and assess if can do his baseline activity (walk with walker with contact guard assist using gait belt) since plan is home tomorrow with his family  - noted pt was put on ventimask overnight due to hypoxia while asleep, pt is mouthbreather per daughter and likely this is chronic.  Advised daughter if happens again tonight suggest she increases his home qhs O2 to 4L for better passive O2 concentration via the NC even if he mouth breaths.  Pt needs referral for sleep study at d/c!        DVT prophylaxis: Lovenox  Discharge Planning: I expect patient to be discharged tomorrow am    Naida Arzola MD   01/26/17   12:05 PM    Please note that portions of this note may have been completed with a voice recognition program. Efforts were made to edit the dictations, but occasionally words are mistranscribed.       Electronically signed by Naida Arzola MD at 1/26/2017 12:05 PM        Physical Therapy Notes (most recent note)     No notes of this type exist for this encounter.        Occupational Therapy Notes (most recent note)     No notes of this type exist for this encounter.

## 2017-01-26 NOTE — PROGRESS NOTES
Continued Stay Note   [facility]     Patient Name: Nam Alvarez  MRN: [id number]  Today's Date: 1/26/2017    Admit Date: 1/24/2017          Discharge Plan       01/26/17 1314    Case Management/Social Work Plan    Plan home with home health    Patient/Family In Agreement With Plan yes    Additional Comments Orders obtained to resume home health at discharge, patient should be medically ready for dc tomorrow. Called orders to Luna at [facility] [phone] and efaxed orders with clinical updates to her at [phone]. Will need to call dc date when patient is ready. CM following.               Discharge Codes     None        Expected Discharge Date and Time     Expected Discharge Date Expected Discharge Time    Jan 28, 2017             Bhumi Alfonso, RN

## 2017-01-26 NOTE — PLAN OF CARE
Problem: Respiratory Insufficiency (Adult)  Goal: Identify Related Risk Factors and Signs and Symptoms  Outcome: Ongoing (interventions implemented as appropriate)  Goal: Acid/Base Balance  Outcome: Ongoing (interventions implemented as appropriate)  Goal: Effective Ventilation  Outcome: Ongoing (interventions implemented as appropriate)    Problem: Fall Risk (Adult)  Goal: Identify Related Risk Factors and Signs and Symptoms  Outcome: Ongoing (interventions implemented as appropriate)  Goal: Absence of Falls  Outcome: Ongoing (interventions implemented as appropriate)    Problem: Pressure Ulcer Risk (Aleks Scale) (Adult,Obstetrics,Pediatric)  Goal: Identify Related Risk Factors and Signs and Symptoms  Outcome: Ongoing (interventions implemented as appropriate)  Goal: Skin Integrity  Outcome: Ongoing (interventions implemented as appropriate)    Problem: Patient Care Overview (Adult)  Goal: Plan of Care Review  Outcome: Outcome(s) achieved Date Met:  01/25/17  Goal: Adult Individualization and Mutuality  Outcome: Ongoing (interventions implemented as appropriate)  Goal: Discharge Needs Assessment  Outcome: Ongoing (interventions implemented as appropriate)

## 2017-01-27 VITALS
SYSTOLIC BLOOD PRESSURE: 116 MMHG | OXYGEN SATURATION: 99 % | TEMPERATURE: 97.6 F | HEART RATE: 80 BPM | RESPIRATION RATE: 17 BRPM | BODY MASS INDEX: 21.33 KG/M2 | DIASTOLIC BLOOD PRESSURE: 60 MMHG | HEIGHT: 70 IN | WEIGHT: 149 LBS

## 2017-01-27 PROBLEM — J44.1 COPD EXACERBATION (HCC): Status: RESOLVED | Noted: 2017-01-25 | Resolved: 2017-01-27

## 2017-01-27 PROBLEM — G93.40 ENCEPHALOPATHY: Status: RESOLVED | Noted: 2017-01-25 | Resolved: 2017-01-27

## 2017-01-27 PROBLEM — J96.02 ACUTE HYPERCAPNIC RESPIRATORY FAILURE (HCC): Status: RESOLVED | Noted: 2017-01-25 | Resolved: 2017-01-27

## 2017-01-27 PROCEDURE — 97162 PT EVAL MOD COMPLEX 30 MIN: CPT

## 2017-01-27 PROCEDURE — 94644 CONT INHLJ TX 1ST HOUR: CPT

## 2017-01-27 PROCEDURE — 94799 UNLISTED PULMONARY SVC/PX: CPT

## 2017-01-27 PROCEDURE — 99239 HOSP IP/OBS DSCHRG MGMT >30: CPT | Performed by: FAMILY MEDICINE

## 2017-01-27 PROCEDURE — 94640 AIRWAY INHALATION TREATMENT: CPT

## 2017-01-27 PROCEDURE — 25010000002 ENOXAPARIN PER 10 MG: Performed by: NURSE PRACTITIONER

## 2017-01-27 PROCEDURE — 63710000001 PREDNISONE PER 5 MG: Performed by: HOSPITALIST

## 2017-01-27 RX ORDER — PREDNISONE 20 MG/1
20 TABLET ORAL
Qty: 15 TABLET | Refills: 0 | Status: SHIPPED | OUTPATIENT
Start: 2017-01-27 | End: 2017-01-27

## 2017-01-27 RX ORDER — PREDNISONE 20 MG/1
20 TABLET ORAL
Qty: 15 TABLET | Refills: 0 | Status: SHIPPED | OUTPATIENT
Start: 2017-01-27

## 2017-01-27 RX ORDER — DOXYCYCLINE HYCLATE 100 MG/1
100 CAPSULE ORAL EVERY 12 HOURS SCHEDULED
Qty: 14 CAPSULE | Refills: 0 | Status: SHIPPED | OUTPATIENT
Start: 2017-01-27 | End: 2017-02-03

## 2017-01-27 RX ORDER — DOXYCYCLINE HYCLATE 100 MG/1
100 CAPSULE ORAL EVERY 12 HOURS SCHEDULED
Qty: 14 CAPSULE | Refills: 0 | Status: SHIPPED | OUTPATIENT
Start: 2017-01-27 | End: 2017-01-27

## 2017-01-27 RX ADMIN — ENOXAPARIN SODIUM 40 MG: 40 INJECTION SUBCUTANEOUS at 08:26

## 2017-01-27 RX ADMIN — BACLOFEN 10 MG: 10 TABLET ORAL at 08:27

## 2017-01-27 RX ADMIN — PREDNISONE 40 MG: 20 TABLET ORAL at 08:27

## 2017-01-27 RX ADMIN — HYDROCODONE BITARTRATE AND ACETAMINOPHEN 1 TABLET: 7.5; 325 TABLET ORAL at 08:37

## 2017-01-27 RX ADMIN — IPRATROPIUM BROMIDE AND ALBUTEROL SULFATE 3 ML: .5; 3 SOLUTION RESPIRATORY (INHALATION) at 07:41

## 2017-01-27 RX ADMIN — DOXYCYCLINE HYCLATE 100 MG: 100 CAPSULE, GELATIN COATED ORAL at 08:26

## 2017-01-27 RX ADMIN — NICOTINE 1 PATCH: 21 PATCH, EXTENDED RELEASE TRANSDERMAL at 05:17

## 2017-01-27 RX ADMIN — POTASSIUM CHLORIDE 20 MEQ: 750 CAPSULE, EXTENDED RELEASE ORAL at 08:27

## 2017-01-27 RX ADMIN — CITALOPRAM HYDROBROMIDE 20 MG: 20 TABLET ORAL at 08:27

## 2017-01-27 RX ADMIN — IPRATROPIUM BROMIDE AND ALBUTEROL SULFATE 3 ML: .5; 3 SOLUTION RESPIRATORY (INHALATION) at 02:30

## 2017-01-27 RX ADMIN — DOCUSATE SODIUM AND SENNOSIDES 2 TABLET: 8.6; 5 TABLET, FILM COATED ORAL at 08:27

## 2017-01-27 NOTE — PROGRESS NOTES
Continued Stay Note  [facility]     Patient Name: Nam Alvarez  MRN: [id number]  Today's Date: 1/27/2017    Admit Date: 1/24/2017          Discharge Plan       01/27/17 1132    Case Management/Social Work Plan    Plan home with home health    Patient/Family In Agreement With Plan yes    Additional Comments Patient is medically ready for discharge today. Called discharged date to Luna at [facility], they have resume orders already. Patient denies any other needs at this time.               Discharge Codes     None        Expected Discharge Date and Time     Expected Discharge Date Expected Discharge Time    Jan 27, 2017             Bhumi Alfonso RN

## 2017-01-27 NOTE — PLAN OF CARE
Problem: Fall Risk (Adult)  Goal: Absence of Falls  Outcome: Ongoing (interventions implemented as appropriate)    01/27/17 0352   Fall Risk (Adult)   Absence of Falls making progress toward outcome         Problem: Pressure Ulcer Risk (Aleks Scale) (Adult,Obstetrics,Pediatric)  Goal: Identify Related Risk Factors and Signs and Symptoms  Outcome: Ongoing (interventions implemented as appropriate)    01/27/17 0352   Pressure Ulcer Risk (Aleks Scale)   Related Risk Factors (Pressure Ulcer Risk (Aleks Scale)) age extremes       Goal: Skin Integrity  Outcome: Ongoing (interventions implemented as appropriate)    01/27/17 0352   Pressure Ulcer Risk (Aleks Scale) (Adult,Obstetrics,Pediatric)   Skin Integrity making progress toward outcome         Problem: Patient Care Overview (Adult)  Goal: Adult Individualization and Mutuality  Outcome: Ongoing (interventions implemented as appropriate)  Goal: Discharge Needs Assessment  Outcome: Ongoing (interventions implemented as appropriate)

## 2017-01-27 NOTE — PROGRESS NOTES
Adult Nutrition  Assessment/PES    Patient Name:  Nam Alvarez  YOB: 1950  MRN: [id number]  Admit Date:  1/24/2017    Assessment Date:  1/27/2017        Reason for Assessment       01/27/17 1204    Reason for Assessment    Reason For Assessment/Visit follow up protocol    Time Spent (min) 20                        Nutrition Prescription Ordered       01/27/17 1205    Nutrition Prescription PO    Current PO Diet Regular    Supplement Ensure HP    Supplement Frequency 3 times a day    Common Modifiers Cardiac            Evaluation of Received Nutrient/Fluid Intake       01/27/17 1205    PO Evaluation    Number of Meals 5    % PO Intake 80              Problem/Interventions:        Problem 1       01/27/17 1205    Nutrition Diagnoses Problem 1    Problem 1 Nutrition Appropriate for Condition at this Time    Etiology (related to) MNT for Treatment/Condition    Signs/Symptoms (evidenced by) PO Intake    Percent (%) intake recorded 80 %    Over number of meals 5                    Intervention Goal       01/27/17 1205    Intervention Goal    PO Maintain intake            Nutrition Intervention       01/27/17 1205    Nutrition Intervention    RD/Tech Action Follow Tx progress              Education/Evaluation       01/27/17 1206    Monitor/Evaluation    Monitor Per protocol        Comments:      Electronically signed by:  Loreta Barbosa MS,RD,LD  01/27/17 12:06 PM

## 2017-01-27 NOTE — PLAN OF CARE
Problem: Respiratory Insufficiency (Adult)  Goal: Identify Related Risk Factors and Signs and Symptoms  Outcome: Ongoing (interventions implemented as appropriate)    01/27/17 0350   Respiratory Insufficiency   Related Risk Factors (Respiratory Insufficiency) activity intolerance   Signs and Symptoms (Respiratory Insufficiency) sleeplessness/fatigue       Goal: Acid/Base Balance  Outcome: Ongoing (interventions implemented as appropriate)    01/27/17 0350   Respiratory Insufficiency (Adult)   Acid/Base Balance making progress toward outcome       Goal: Effective Ventilation  Outcome: Ongoing (interventions implemented as appropriate)    Problem: Fall Risk (Adult)  Goal: Absence of Falls  Outcome: Ongoing (interventions implemented as appropriate)

## 2017-01-27 NOTE — PLAN OF CARE
Problem: Patient Care Overview (Adult)  Goal: Plan of Care Review  Outcome: Ongoing (interventions implemented as appropriate)    01/27/17 0909   Coping/Psychosocial Response Interventions   Plan Of Care Reviewed With patient;daughter   Patient Care Overview   Progress progress toward functional goals as expected   Outcome Evaluation   Outcome Summary/Follow up Plan PT masonal completed. Pt dem balance deficits in presence of hx of falls at home, weakness, and decreased coordination and will benefit from PT services to address limitations. Pt amb 30ft with RW with [place] x2. Pt will benefit from HHPT upon d/c to home with family.         Problem: Inpatient Physical Therapy  Goal: Transfer Training Goal 1 LTG- PT  Outcome: Ongoing (interventions implemented as appropriate)    01/27/17 0909   Transfer Training PT LTG   Transfer Training PT LTG, Date Established 01/27/17   Transfer Training PT LTG, Time to Achieve 2 wks   Transfer Training PT LTG, Activity Type bed to chair /chair to bed;sit to stand/stand to sit   Transfer Training PT LTG, [place] Level contact guard assist   Transfer Training PT LTG, Assist Device walker, rolling       Goal: Gait Training Goal LTG- PT  Outcome: Ongoing (interventions implemented as appropriate)    01/27/17 0909   Gait Training PT LTG   Gait Training Goal PT LTG, Date Established 01/27/17   Gait Training Goal PT LTG, Time to Achieve 2 wks   Gait Training Goal PT LTG, [place] Level minimum assist (75% patient effort)   Gait Training Goal PT LTG, Assist Device walker, rolling   Gait Training Goal PT LTG, Distance to Achieve 100   Gait Training Goal PT LTG, Outcome goal ongoing

## 2017-01-27 NOTE — DISCHARGE SUMMARY
[facility] Medicine Services  DISCHARGE SUMMARY       Date of Admission: 1/24/2017  Date of Discharge:  1/27/2017  Primary Care Physician: No Known Provider    Discharge Diagnoses:  Active Hospital Problems (** Indicates Principal Problem)    Diagnosis Date Noted   • Hx of Stroke with residual right HP [I63.9] 01/26/2017   • Tobacco abuse [Z72.0] 01/25/2017      Resolved Hospital Problems    Diagnosis Date Noted Date Resolved   • COPD exacerbation [J44.1] 01/25/2017 01/27/2017   • Acute hypercapnic respiratory failure [J96.02] 01/25/2017 01/27/2017   • Metabolic and hypoxic/hypercapneic encephalopathy [G93.40] 01/25/2017 01/27/2017       Presenting Problem/History of Present Illness  COPD exacerbation [J44.1]     Chief Complaint on Day of Discharge:   Dyspnea    History of Present Illness on Day of Discharge:   Pt feels at baseline, was able to abmulate with walker today.  Family agrres he is at baseline MATHIS and activity.    Hospital Course  Patient is a 66 y.o. male presented as transfer from outside facility with metabolic encephalopathy, acute hypercapneic resp failure an acute COPD exacerbation.  At baseline he has hx of prior CVA with residual right hemiparesis which was perceived as worsened with his presentation however imaging was overall unremarkable for any new evidence of CVA and his presenting clinical condition was determined to be related to his hypercapnia and encephalopathy.  With BiPap, aggressive nebs, steroids and empiric abx for COPD exac he was able to improve and now is on his baseline home 3L NC continuous O2 and has been afebrile with VSS.  He is able to ambulate with contact assist and walker, which is his baseline mobility, on day of discharge.  He will return home with his family and HH for SN/PT has been arranged.         Pertinent Test Results:      Results from last 7 days  Lab Units 01/26/17  0613 01/25/17  0323 01/24/17  2247 01/24/17  2245   WBC 10*3/mm3  14.73* 15.33*  --  15.06*   HEMOGLOBIN g/dL 10.3* 11.7*  --  13.6   HEMOGLOBIN, ARTERIAL POC g/dL  --   --  14.6  --    HEMATOCRIT % 33.2* 38.6*  --  44.0   HEMATOCRIT POC %  --   --  43  --    PLATELETS 10*3/mm3 214 247  --  279         Results from last 7 days  Lab Units 01/26/17  0613 01/24/17 2247 01/24/17 2245   SODIUM mmol/L 138  --  144   POTASSIUM mmol/L 3.7  --  4.3   CHLORIDE mmol/L 100  --  102   TOTAL CO2 mmol/L 37.0*  --  39.0*   BUN mg/dL 17  --  14   CREATININE mg/dL 0.60 0.80 0.80   GLUCOSE mg/dL 118*  --  92   CALCIUM mg/dL 9.6  --  10.2       Imaging Results (all)     Procedure Component Value Units Date/Time    CT Head Without Contrast [[id number]]  (Abnormal) Collected:  01/24/17 2223     Updated:  01/24/17 2230    Narrative:       EXAM:    CT Head Without Intravenous Contrast.    CLINICAL HISTORY:    66 years old, male; Signs and symptoms; Speech disturbance and weakness,   extremity and weakness, facial; Additional info: Stroke    TECHNIQUE:    Axial computed tomography images of the head/brain without intravenous   contrast.  This CT exam was performed using one or more of the following dose   reduction techniques:  automated exposure control, adjustment of the mA and/or   kV according to patient size, and/or use of iterative reconstruction technique.    EXAM DATE/TIME:    1/24/2017 10:23 PM    COMPARISON:    No relevant prior studies available.    FINDINGS:    Brain:  Decreased attenuation of the supratentorial white matter is likely   secondary to chronic microvascular ischemia.  Chronic lacunar infarcts   involving the basal ganglia and thalami.  No hemorrhage.    Ventricles:  Ventricular and subarachnoid spaces are age appropriate.    Bones/joints:  Unremarkable.  No acute fracture.    Soft tissues:  Unremarkable.    Vasculature:  Intracranial vascular calcifications.    Sinuses:  Mild sporadic paranasal sinus mucosal disease.    Mastoid air cells:  Unremarkable as visualized.  No mastoid  effusion.      Impression:         No acute intracranial abnormality.      If clinical concern persists, MRI may be considered.      THIS DOCUMENT HAS BEEN ELECTRONICALLY SIGNED BY BRIANNE QUEZADA MD    CT Cerebral Perfusion With & Without Contrast [[id number]]  (Abnormal) Collected:  01/24/17 2229     Updated:  01/24/17 2313    Narrative:       EXAM:    CT Brain Perfusion With Intravenous Contrast.    CLINICAL HISTORY:    66 years old, male; Signs and symptoms; Weakness, extremity; Additional info:   Code 19, [facility] already read    TECHNIQUE:    Axial computed tomography images of the brain with intravenous contrast using   cerebral perfusion protocol.  Post-processing parametric maps were created and   reviewed.  These include cerebral blood flow, cerebral blood volume and mean   transit time.  This CT exam was performed using one or more of the following   dose reduction techniques:  automated exposure control, adjustment of the mA   and/or kV according to patient size, and/or use of iterative reconstruction   technique.    MIP reconstructed images were created and reviewed.    CONTRAST:    40 mL of isovue 370 administered intravenously.    EXAM DATE/TIME:    1/24/2017 10:29 PM    COMPARISON:    CT HEAD WO CONTRAST 1/24/2017 10:23:11 PM    FINDINGS:    There is symmetric cerebral blood flow and volume.  No pathologic elevation   in mean transit time or time to drain.      Impression:         Symmetric perfusion without evidence of acute infarction or ischemia.    Further evaluation with diffusion weighted MRI as clinically warranted.      THIS DOCUMENT HAS BEEN ELECTRONICALLY SIGNED BY BRIANNE QUEZADA MD    XR Chest 1 View [[id number]] Collected:  01/25/17 0932     Updated:  01/25/17 1228    Narrative:       EXAMINATION: XR CHEST 1 VIEW-01/24/2017:      INDICATION: Dyspnea.      COMPARISON: NONE     FINDINGS: Portable chest reveals cardiac and mediastinal silhouettes to  be within  " normal limits. The lung fields are grossly clear. Underlying  chronic and emphysematous changes seen within the lung fields  bilaterally. Degenerative changes seen within the spine. Pulmonary  vascularity is within normal limits.           Impression:       No acute cardiopulmonary disease. Chronic and emphysematous  changes seen within the lung fields bilaterally.     FAX TO: [facility]     D:  01/25/2017  E:  01/25/2017     This report was finalized on 1/25/2017 12:26 PM by Dr. Aleena Shafer MD.               Condition on Discharge:   Stable    Physical Exam on Discharge:  Visit Vitals   • /60 (BP Location: Right arm, Patient Position: Lying)   • Pulse 80   • Temp 97.6 °F (36.4 °C) (Oral)   • Resp 17   • Ht 70\" (177.8 cm)   • Wt 149 lb (67.6 kg)   • SpO2 97%   • BMI 21.38 kg/m2     Physical Exam  Constitutional: no acute distress, awake, alert  Respiratory: Clear to auscultation bilaterally, no wheezes currently, nonlabored respirations   Cardiovascular: RRR  Gastrointestinal: Positive bowel sounds, soft, nontender, nondistended  Musculoskeletal: No bilateral ankle edema  Psychiatric: oriented x 3, appropriate affect, cooperative  Neurologic: Strength symmetric in all extremities     Discharge Disposition  Home or Self Care    Discharge Medications   Nam Alvarez   Home Medication Instructions ANTHONY:[id number]    Printed on:01/27/17 1030   Medication Information                      acetaminophen (TYLENOL) 500 MG tablet  Take 500 mg by mouth 3 (Three) Times a Day As Needed for mild pain (1-3).             albuterol (PROVENTIL HFA;VENTOLIN HFA) 108 (90 BASE) MCG/ACT inhaler  Inhale 2 puffs Every 4 (Four) Hours As Needed for wheezing.             baclofen (LIORESAL) 10 MG tablet  Take 10 mg by mouth 2 (Two) Times a Day.             citalopram (CeleXA) 20 MG tablet  Take 20 mg by mouth Daily.             doxycycline (VIBRAMYCIN) 100 MG capsule  Take 1 capsule by mouth Every 12 (Twelve) Hours for 7 days. " Indications: Upper Respiratory Tract Infection             fexofenadine (ALLEGRA) 180 MG tablet  Take 180 mg by mouth Daily.             furosemide (LASIX) 40 MG tablet  Take 40 mg by mouth Daily. PRN swelling             guaiFENesin 200 MG tablet  Take 600 mg by mouth Daily. At night             HYDROcodone-acetaminophen (NORCO) 7.5-325 MG per tablet  Take 1 tablet by mouth Every 8 (Eight) Hours As Needed for moderate pain (4-6).             losartan (COZAAR) 100 MG tablet  Take 100 mg by mouth Daily.             mirtazapine (REMERON) 15 MG tablet  Take 15 mg by mouth Every Night.             mometasone-formoterol (DULERA 100) 100-5 MCG/ACT inhaler  Inhale 2 puffs 2 (Two) Times a Day.             montelukast (SINGULAIR) 10 MG tablet  Take 10 mg by mouth Every Night.             potassium chloride (MICRO-K) 10 MEQ CR capsule  Take 20 mEq by mouth As Needed (When taking Lasix).             predniSONE (DELTASONE) 20 MG tablet  Take 1 tablet by mouth Daily With Breakfast. 40mg PO qday for 3d, 30 mg PO qday for 3d, 20 mg PO qday for  3d, 10 mg PO qday for  3d             raNITIdine (ZANTAC) 150 MG tablet  Take 150 mg by mouth 2 (Two) Times a Day.             simvastatin (ZOCOR) 20 MG tablet  Take 20 mg by mouth Every Night.             tiotropium (SPIRIVA) 18 MCG per inhalation capsule  Place 1 capsule into inhaler and inhale Daily.             traZODone (DESYREL) 50 MG tablet  Take 50 mg by mouth Every Night.                 Discharge Diet:   Cardiac, low sodium    Discharge Care Plan / Instructions:   for SN/PT arranged    Activity at Discharge:   As tolerates    Follow-up Appointments  No future appointments.  Referrals and Follow-ups to Schedule     Follow-Up    As directed    In 1-2 weeks   Follow Up Details:  PCP                 Test Results Pending at Discharge   Order Current Status    Respiratory Culture In process    Blood Culture Preliminary result    Blood Culture Preliminary result           Naida Arzola  MD 01/27/17 10:30 AM    Time: 35 min    Please note that portions of this note may have been completed with a voice recognition program. Efforts were made to edit the dictations, but occasionally words are mistranscribed.

## 2017-01-27 NOTE — PROGRESS NOTES
Acute Care - Physical Therapy Initial Evaluation  [facility]     Patient Name: Nam Alvarez  : 1950  MRN: [id number]  Today's Date: 2017   Onset of Illness/Injury or Date of Surgery Date: 17  Date of Referral to PT: 17  Referring Physician: MD Dariusz      Admit Date: 2017     Visit Dx:    ICD-10-CM ICD-9-CM   1. COPD exacerbation J44.1 491.21   2. Hypoxia R09.02 799.02   3. Hypercapnia R06.89 786.09   4. Fatigue, unspecified type R53.83 780.79   5. Impaired functional mobility, balance, gait, and endurance Z74.09 V49.89     Patient Active Problem List   Diagnosis   • COPD exacerbation   • Acute hypercapnic respiratory failure   • Tobacco abuse   • Metabolic and hypoxic/hypercapneic encephalopathy   • Hx of Stroke with residual right HP     Past Medical History   Diagnosis Date   • COPD (chronic obstructive pulmonary disease)    • Hypertension    • Stroke      pt's family states pt had stroke on  that effected right side of body and pt's speech.     Past Surgical History   Procedure Laterality Date   • Abdominal surgery            PT ASSESSMENT (last 72 hours)      PT Evaluation       17 0820 17    Rehab Evaluation    Document Type evaluation  -SJ     Subjective Information no complaints;agree to therapy  -SJ     Patient Effort, Rehab Treatment good  -SJ     Patient Effort, Rehab Treatment Comment req encouragement to participate, good effort once begun  -SJ     Symptoms Noted During/After Treatment fatigue  -SJ     General Information    Patient Profile Review yes  -SJ     Onset of Illness/Injury or Date of Surgery Date 17  -SJ     Referring Physician MD Dariusz  -SJ     General Observations Pt supine in bed, awake, with tele, O2 nc, daughter present  -SJ     Pertinent History Of Current Problem 66 y.o.M arrived to [facility] ED by helicopter for COPD exac, R-sided weakness, confusion  -SJ     Precautions/Limitations fall precautions;oxygen therapy device and  L/min  -SJ     Prior Level of Function mod assist:;all household mobility;gait;transfer;dependent:;community mobility  -SJ     Equipment Currently Used at Home wheelchair;walker, rolling;cane, straight;commode;shower chair  -SJ     Plans/Goals Discussed With patient and family;agreed upon  -SJ     Risks Reviewed patient and family:;dizziness;LOB;increased discomfort;change in vital signs  -SJ     Benefits Reviewed patient and family:;improve function;increase independence;increase strength;increase balance;increase knowledge  -SJ     Barriers to Rehab previous functional deficit;cognitive status  -SJ     Living Environment    Lives With child(tosha), adult  -SJ     Living Arrangements apartment  -SJ     Home Accessibility ramps present at home;bed and bath on same level  -SJ     Clinical Impression    Date of Referral to PT 01/26/17  -SJ     PT Diagnosis impaired mobility  -SJ     Patient/Family Goals Statement return to home today  -SJ     Criteria for Skilled Therapeutic Interventions Met yes;treatment indicated  -SJ     Pathology/Pathophysiology Noted (Describe Specifically for Each System) neuromuscular  -SJ     Impairments Found (describe specific impairments) gait, locomotion, and balance;aerobic capacity/endurance  -SJ     Functional Limitations in Following Categories (Describe Specific Limitations) self-care  -SJ     Rehab Potential good, to achieve stated therapy goals  -SJ     Predicted Duration of Therapy Intervention (days/wks) 2wks  -SJ     Vital Signs    Post SpO2 (%) 96  -SJ     O2 Delivery Post Treatment supplemental O2  -SJ     Pre Patient Position Supine  -SJ     Intra Patient Position Standing  -SJ     Post Patient Position Sitting  -SJ     Pain Assessment    Pain Assessment No/denies pain  -SJ     Vision Assessment/Intervention    Visual Impairment WFL  -SJ     Cognitive Assessment/Intervention    Current Cognitive/Communication Assessment impaired  -SJ     Orientation Status oriented  to;person;place;required verbal cueing (specifiy in comments)  -SJ     Follows Commands/Answers Questions 75% of the time;able to follow single-step instructions  -SJ     Personal Safety moderate impairment;decreased awareness, need for assist  -SJ     Personal Safety Interventions fall prevention program maintained;gait belt;nonskid shoes/slippers when out of bed  -SJ     ROM (Range of Motion)    General ROM no range of motion deficits identified  -SJ     MMT (Manual Muscle Testing)    General MMT Assessment lower extremity strength deficits identified  -SJ     General MMT Assessment Detail LLE 4+/5, RLE 4/5  -SJ     Muscle Tone Assessment    Right-Side Extremities Muscle Tone Assessment mildly decreased tone  -SJ mildly decreased tone  -AS    Bed Mobility, Assessment/Treatment    Bed Mob, Supine to Sit, [place] conditional independence  -SJ     Bed Mobility, Comment no safety concerns  -SJ     Transfer Assessment/Treatment    Transfers, Bed-Chair [place] minimum assist (75% patient effort);verbal cues required  -SJ     Transfers, Bed-Chair-Bed, Assist Device rolling walker  -SJ     Transfers, Sit-Stand [place] minimum assist (75% patient effort);verbal cues required  -SJ     Transfers, Stand-Sit [place] contact guard assist;verbal cues required  -SJ     Transfers, Sit-Stand-Sit, Assist Device rolling walker  -SJ     Transfer, Safety Issues sequencing ability decreased;weight-shifting ability decreased;loses balance backward  -SJ     Transfer, Impairments coordination impaired;muscle tone abnormal  -SJ     Transfer, Comment practiced sit <--> stand t/f x3 reps, progressing from [place] to CGA with cues for weight shifting and hand placement  -SJ     Gait Assessment/Treatment    Gait, [place] Level minimum assist (75% patient effort);2 person assist required;verbal cues required  -SJ     Gait, Assistive Device rolling walker  -SJ     Gait, Distance (Feet) 30  -SJ     Gait, Gait Pattern Analysis  swing-to gait  -     Gait, Gait Deviations ataxia;narrow base;decreased heel strike  -     Gait, Safety Issues balance decreased during turns;step length decreased;weight-shifting ability decreased;loses balance backward;supplemental O2  -SJ     Gait, Impairments muscle tone abnormal;impaired balance;coordination impaired  -SJ     Gait, Comment several LOB req [place] x2, pt's daughter assisted with mobility  -     Motor Skills/Interventions    Additional Documentation Balance Skills Training (Group)  -     Balance Skills Training    Sitting-Level of Assistance Independent  -     Sitting-Balance Support Feet supported;Right upper extremity supported;Left upper extremity supported  -     Standing-Level of Assistance Contact guard  -     Static Standing Balance Support assistive device  -     Gait Balance-Level of Assistance Minimum assistance;x2  -     Gait Balance Support assistive device  -     Positioning and Restraints    Pre-Treatment Position in bed  -SJ     Post Treatment Position chair  -     In Chair reclined;call light within reach;encouraged to call for assist;with family/caregiver  -SJ       01/26/17 1828 01/26/17 1630    Muscle Tone Assessment    Muscle Tone Assessment Right-Side Extremities  -NF Right-Side Extremities  -NF    Right-Side Extremities Muscle Tone Assessment mildly decreased tone  -NF mildly decreased tone  -NF      01/26/17 1430 01/26/17 1230    Muscle Tone Assessment    Muscle Tone Assessment Right-Side Extremities  -NF Right-Side Extremities  -NF    Right-Side Extremities Muscle Tone Assessment mildly decreased tone  -NF mildly decreased tone  -NF      01/26/17 1030 01/26/17 0845    Muscle Tone Assessment    Muscle Tone Assessment Right-Side Extremities  -NF Right-Side Extremities  -NF    Right-Side Extremities Muscle Tone Assessment mildly decreased tone  -NF mildly decreased tone  -NF      01/26/17 0800 01/25/17 2000    Muscle Tone Assessment    Muscle Tone  Assessment Right-Side Extremities  -NF Right-Side Extremities  -KG    Right-Side Extremities Muscle Tone Assessment mildly decreased tone  -NF mildly decreased tone  -KG      01/25/17 1803 01/25/17 1630    Muscle Tone Assessment    Muscle Tone Assessment Right-Side Extremities  -NF Right-Side Extremities  -NF      01/25/17 1517 01/25/17 1445    General Information    Equipment Currently Used at Home wheelchair;walker, rolling;cane, straight;commode;shower chair  -     Living Environment    Lives With child(tosha), adult   daughter Prema  -     Living Arrangements apartment  -     Muscle Tone Assessment    Muscle Tone Assessment  Right-Side Extremities  -NF      01/25/17 1400 01/25/17 1200    Muscle Tone Assessment    Muscle Tone Assessment Right-Side Extremities  -NF Right-Side Extremities  -NF      01/25/17 1000 01/25/17 0800    Muscle Tone Assessment    Muscle Tone Assessment Right-Side Extremities  -NF Right-Side Extremities  -NF    Right-Side Extremities Muscle Tone Assessment --  -NF mildly decreased tone  -NF      01/25/17 0200       General Information    Equipment Currently Used at Home wheelchair  -JK     Living Environment    Lives With child(tosha), adult  -JK     Living Arrangements apartment  -JK     Home Accessibility stairs (2 railings present)  -JK     Stair Railings at Home inside, present at both sides  -JK     Type of Financial/Environmental Concern none  -JK     Transportation Available car;family or friend will provide  -JK       User Key  (r) = Recorded By, (t) = Taken By, (c) = Cosigned By    Initials Name Provider Type    THIERRY Madrid, NISHANT Physical Therapist    AS Eliz Tate, RN Registered Nurse    CARI Alfonso, GRACIE Case Manager    NF Michael Ferreira, RN Registered Nurse    MARV Cramer, RN Registered Nurse    COOPER Somers, RN Registered Nurse          Physical Therapy Education     Title: PT OT SLP Therapies (Done)     Topic: Physical Therapy (Done)      Point: Mobility training (Done)    Learning Progress Summary    Learner Readiness Method Response Comment Documented by Status   Patient Acceptance EANTOINE DU Daughter ed in use of gait belt, pt ed for sit <--> stand t/f safety  01/27/17 0912 Done   Family Acceptance ANTOINE WALDRON DU Daughter ed in use of gait belt, pt ed for sit <--> stand t/f safety  01/27/17 0912 Done               Point: Body mechanics (Done)    Learning Progress Summary    Learner Readiness Method Response Comment Documented by Status   Patient Acceptance ANTOINE WALDRON,DRAGAN Daughter ed in use of gait belt, pt ed for sit <--> stand t/f safety  01/27/17 0912 Done   Family Acceptance EANTOINE,DRAGAN Daughter ed in use of gait belt, pt ed for sit <--> stand t/f safety  01/27/17 0912 Done                      User Key     Initials Effective Dates Name Provider Type Discipline     06/19/15 -  Stacey Madrid, PT Physical Therapist PT                PT Recommendation and Plan  Anticipated Equipment Needs At Discharge: gait belt  Anticipated Discharge Disposition: home with assist, home with home health  Planned Therapy Interventions: balance training, gait training, home exercise program, patient/family education, strengthening, transfer training  PT Frequency: daily  Plan of Care Review  Plan Of Care Reviewed With: patient, daughter  Progress: progress toward functional goals as expected  Outcome Summary/Follow up Plan: PT eval completed. Pt dem balance deficits, weakness, and decreased coordination and will benefit from PT services to address limitations. Pt amb 30ft with RW with Mayank x2. Pt will benefit from HHPT upon d/c to home with family.          IP PT Goals       01/27/17 0909          Transfer Training PT LTG    Transfer Training PT LTG, Date Established 01/27/17  -SJ      Transfer Training PT LTG, Time to Achieve 2 wks  -SJ      Transfer Training PT LTG, Activity Type bed to chair /chair to bed;sit to stand/stand to sit  -SJ      Transfer Training PT  LTG, [place] Level contact guard assist  -SJ      Transfer Training PT LTG, Assist Device walker, rolling  -SJ      Gait Training PT LTG    Gait Training Goal PT LTG, Date Established 01/27/17  -      Gait Training Goal PT LTG, Time to Achieve 2 wks  -SJ      Gait Training Goal PT LTG, [place] Level minimum assist (75% patient effort)  -SJ      Gait Training Goal PT LTG, Assist Device walker, rolling  -SJ      Gait Training Goal PT LTG, Distance to Achieve 100  -SJ      Gait Training Goal PT LTG, Outcome goal ongoing  -        User Key  (r) = Recorded By, (t) = Taken By, (c) = Cosigned By    Initials Name Provider Type    THIERRY Madrid PT Physical Therapist                Outcome Measures       01/27/17 0820          How much help from another person do you currently need...    Turning from your back to your side while in flat bed without using bedrails? 4  -SJ      Moving from lying on back to sitting on the side of a flat bed without bedrails? 4  -SJ      Moving to and from a bed to a chair (including a wheelchair)? 3  -SJ      Standing up from a chair using your arms (e.g., wheelchair, bedside chair)? 3  -SJ      Climbing 3-5 steps with a railing? 2  -SJ      To walk in hospital room? 2  -SJ      AM-PAC 6 Clicks Score 18  -SJ      Modified Radha Scale    Modified [place]ha Scale 4 - Moderately severe disability.  Unable to walk without assistance, and unable to attend to own bodily needs without assistance.  -SJ      Functional Assessment    Outcome Measure Options AM-PAC 6 Clicks Basic Mobility (PT);Modified [place]  -        User Key  (r) = Recorded By, (t) = Taken By, (c) = Cosigned By    Initials Name Provider Type    THIERRY Madrid PT Physical Therapist           Time Calculation:         PT Charges       01/27/17 0914          Time Calculation    Start Time 0820  -SJ      PT Received On 01/27/17  -      PT Goal Re-Cert Due Date 02/06/17  -        User Key  (r) = Recorded By, (t)  = Taken By, (c) = Cosigned By    Initials Name Provider Type     Stacey Madrid, PT Physical Therapist          Therapy Charges for Today     Code Description Service Date Service Provider Modifiers Qty    75055192492 HC PT EVAL MOD COMPLEXITY 4 1/27/2017 Stacey Madrid, PT GP 1          PT G-Codes  Outcome Measure Options: AM-PAC 6 Clicks Basic Mobility (PT), Modified Radha      Stacey Madrid, PT  1/27/2017

## 2017-01-27 NOTE — PLAN OF CARE
Problem: Respiratory Insufficiency (Adult)  Goal: Identify Related Risk Factors and Signs and Symptoms  Outcome: Ongoing (interventions implemented as appropriate)  Goal: Acid/Base Balance  Outcome: Ongoing (interventions implemented as appropriate)  Goal: Effective Ventilation  Outcome: Ongoing (interventions implemented as appropriate)    Problem: Fall Risk (Adult)  Goal: Identify Related Risk Factors and Signs and Symptoms  Outcome: Outcome(s) achieved Date Met:  01/26/17  Goal: Absence of Falls  Outcome: Ongoing (interventions implemented as appropriate)    Problem: Pressure Ulcer Risk (Aleks Scale) (Adult,Obstetrics,Pediatric)  Goal: Identify Related Risk Factors and Signs and Symptoms  Outcome: Ongoing (interventions implemented as appropriate)  Goal: Skin Integrity  Outcome: Ongoing (interventions implemented as appropriate)    Problem: Patient Care Overview (Adult)  Goal: Adult Individualization and Mutuality  Outcome: Ongoing (interventions implemented as appropriate)  Goal: Discharge Needs Assessment  Outcome: Ongoing (interventions implemented as appropriate)  Pt may need more oxygen ordered if rate is higher then current 2 liters used at home now     Sleep study?

## 2017-01-27 NOTE — PLAN OF CARE
Problem: Patient Care Overview (Adult)  Goal: Plan of Care Review  Outcome: Outcome(s) achieved Date Met:  01/27/17 01/27/17 1130   Coping/Psychosocial Response Interventions   Plan Of Care Reviewed With patient;spouse   Patient Care Overview   Progress progress toward functional goals as expected

## 2017-01-28 LAB
BACTERIA SPEC RESP CULT: NORMAL
GRAM STN SPEC: NORMAL

## 2017-01-30 LAB
BACTERIA SPEC AEROBE CULT: NORMAL

## 2017-01-30 NOTE — THERAPY DISCHARGE NOTE
Acute Care - Physical Therapy Discharge Summary  [facility]       Patient Name: Nam Alvarez  : 1950  MRN: [id number]    Today's Date: 2017  Onset of Illness/Injury or Date of Surgery Date: 17    Date of Referral to PT: 17  Referring Physician: MD Dariusz      Admit Date: 2017      PT Recommendation and Plan    Visit Dx:    ICD-10-CM ICD-9-CM   1. COPD exacerbation J44.1 491.21   2. Hypoxia R09.02 799.02   3. Hypercapnia R06.89 786.09   4. Fatigue, unspecified type R53.83 780.79   5. Impaired functional mobility, balance, gait, and endurance Z74.09 V49.89             Outcome Measures       17 0820          How much help from another person do you currently need...    Turning from your back to your side while in flat bed without using bedrails? 4  -SJ      Moving from lying on back to sitting on the side of a flat bed without bedrails? 4  -SJ      Moving to and from a bed to a chair (including a wheelchair)? 3  -SJ      Standing up from a chair using your arms (e.g., wheelchair, bedside chair)? 3  -SJ      Climbing 3-5 steps with a railing? 2  -SJ      To walk in hospital room? 2  -SJ      AM-PAC 6 Clicks Score 18  -SJ      Modified [place] Scale    Modified [place] Scale 4 - Moderately severe disability.  Unable to walk without assistance, and unable to attend to own bodily needs without assistance.  -SJ      Functional Assessment    Outcome Measure Options AM-PAC 6 Clicks Basic Mobility (PT);Modified [place]  -SJ        User Key  (r) = Recorded By, (t) = Taken By, (c) = Cosigned By    Initials Name Provider Type    THIERRY Madrid PT Physical Therapist                      IP PT Goals       17 0909          Transfer Training PT LTG    Transfer Training PT LTG, Date Established 17  -SJ      Transfer Training PT LTG, Time to Achieve 2 wks  -SJ      Transfer Training PT LTG, Activity Type bed to chair /chair to bed;sit to stand/stand to sit  -SJ      Transfer  Training PT LTG, [place] Level contact guard assist  -SJ      Transfer Training PT LTG, Assist Device walker, rolling  -SJ      Gait Training PT LTG    Gait Training Goal PT LTG, Date Established 01/27/17  -SJ      Gait Training Goal PT LTG, Time to Achieve 2 wks  -SJ      Gait Training Goal PT LTG, [place] Level minimum assist (75% patient effort)  -SJ      Gait Training Goal PT LTG, Assist Device walker, rolling  -SJ      Gait Training Goal PT LTG, Distance to Achieve 100  -SJ      Gait Training Goal PT LTG, Outcome goal ongoing  -SJ        User Key  (r) = Recorded By, (t) = Taken By, (c) = Cosigned By    Initials Name Provider Type    THIERRY Madrid, PT Physical Therapist           Goals Status: Treatment plan discontinued secondary to discharge from acute facility.      PT Discharge Summary  Anticipated Discharge Disposition: home with assist, home with home health  Reason for Discharge: Discharge from facility  Discharge Destination: Home with home health      Rocio Parrish, PT   1/30/2017